# Patient Record
Sex: FEMALE | Race: WHITE | NOT HISPANIC OR LATINO | Employment: PART TIME | URBAN - METROPOLITAN AREA
[De-identification: names, ages, dates, MRNs, and addresses within clinical notes are randomized per-mention and may not be internally consistent; named-entity substitution may affect disease eponyms.]

---

## 2021-05-31 ENCOUNTER — RECORDS - HEALTHEAST (OUTPATIENT)
Dept: ADMINISTRATIVE | Facility: CLINIC | Age: 61
End: 2021-05-31

## 2021-07-13 ENCOUNTER — RECORDS - HEALTHEAST (OUTPATIENT)
Dept: ADMINISTRATIVE | Facility: CLINIC | Age: 61
End: 2021-07-13

## 2021-07-21 ENCOUNTER — RECORDS - HEALTHEAST (OUTPATIENT)
Dept: ADMINISTRATIVE | Facility: CLINIC | Age: 61
End: 2021-07-21

## 2022-06-15 ENCOUNTER — HOSPITAL ENCOUNTER (OUTPATIENT)
Dept: ULTRASOUND IMAGING | Facility: HOSPITAL | Age: 62
Discharge: HOME | End: 2022-06-15
Attending: INTERNAL MEDICINE | Admitting: INTERNAL MEDICINE

## 2022-06-15 DIAGNOSIS — I10 ESSENTIAL (PRIMARY) HYPERTENSION: ICD-10-CM

## 2022-06-15 DIAGNOSIS — R10.9 RIGHT SIDED ABDOMINAL PAIN: ICD-10-CM

## 2022-06-15 PROCEDURE — 76700 US EXAM ABDOM COMPLETE: CPT

## 2022-07-17 ENCOUNTER — HEALTH MAINTENANCE LETTER (OUTPATIENT)
Age: 62
End: 2022-07-17

## 2022-09-25 ENCOUNTER — HEALTH MAINTENANCE LETTER (OUTPATIENT)
Age: 62
End: 2022-09-25

## 2023-08-06 ENCOUNTER — HEALTH MAINTENANCE LETTER (OUTPATIENT)
Age: 63
End: 2023-08-06

## 2023-09-22 ENCOUNTER — OFFICE VISIT (OUTPATIENT)
Dept: FAMILY MEDICINE | Facility: CLINIC | Age: 63
End: 2023-09-22
Payer: COMMERCIAL

## 2023-09-22 VITALS
SYSTOLIC BLOOD PRESSURE: 118 MMHG | OXYGEN SATURATION: 98 % | HEIGHT: 67 IN | RESPIRATION RATE: 15 BRPM | WEIGHT: 127.6 LBS | TEMPERATURE: 97 F | BODY MASS INDEX: 20.03 KG/M2 | HEART RATE: 67 BPM | DIASTOLIC BLOOD PRESSURE: 69 MMHG

## 2023-09-22 DIAGNOSIS — Z76.89 ENCOUNTER TO ESTABLISH CARE: Primary | ICD-10-CM

## 2023-09-22 DIAGNOSIS — A69.20 LYME DISEASE, UNSPECIFIED: ICD-10-CM

## 2023-09-22 DIAGNOSIS — Z12.31 VISIT FOR SCREENING MAMMOGRAM: ICD-10-CM

## 2023-09-22 DIAGNOSIS — E28.39 ESTROGEN DEFICIENCY: ICD-10-CM

## 2023-09-22 DIAGNOSIS — J30.9 CHRONIC ALLERGIC RHINITIS: ICD-10-CM

## 2023-09-22 PROCEDURE — 90471 IMMUNIZATION ADMIN: CPT | Performed by: INTERNAL MEDICINE

## 2023-09-22 PROCEDURE — 99203 OFFICE O/P NEW LOW 30 MIN: CPT | Mod: 25 | Performed by: INTERNAL MEDICINE

## 2023-09-22 PROCEDURE — 90715 TDAP VACCINE 7 YRS/> IM: CPT | Performed by: INTERNAL MEDICINE

## 2023-09-22 RX ORDER — BENZONATATE 100 MG/1
100 CAPSULE ORAL
COMMUNITY
Start: 2023-05-26 | End: 2023-09-22

## 2023-09-22 RX ORDER — MONTELUKAST SODIUM 10 MG/1
TABLET ORAL
COMMUNITY
Start: 2023-03-28

## 2023-09-22 RX ORDER — SUMATRIPTAN 50 MG/1
50 TABLET, FILM COATED ORAL
Qty: 27 TABLET | Refills: 1 | COMMUNITY
Start: 2023-09-22 | End: 2024-09-09

## 2023-09-22 RX ORDER — AZELASTINE HYDROCHLORIDE, FLUTICASONE PROPIONATE 137; 50 UG/1; UG/1
1 SPRAY, METERED NASAL
COMMUNITY
Start: 2022-06-08 | End: 2024-09-09

## 2023-09-22 RX ORDER — AZELASTINE HYDROCHLORIDE, FLUTICASONE PROPIONATE 137; 50 UG/1; UG/1
2 SPRAY, METERED NASAL 2 TIMES DAILY
Qty: 69 G | Refills: 3 | Status: SHIPPED | OUTPATIENT
Start: 2023-09-22 | End: 2024-09-09

## 2023-09-22 RX ORDER — SUMATRIPTAN 50 MG/1
TABLET, FILM COATED ORAL
COMMUNITY
Start: 2022-10-20 | End: 2023-09-22

## 2023-09-22 NOTE — PROGRESS NOTES
Assessment & Plan     Radhika was seen today for establish care.    Diagnoses and all orders for this visit:    Encounter to establish care    Visit for screening mammogram  -     *MA Screening Digital Bilateral; Future    Estrogen deficiency  -     DEXA HIP/PELVIS/SPINE - Future; Future    Lyme disease, unspecified  Comments:  chronic lyme disease dx 2008, some headache and arthritis, poor stamina    Chronic allergic rhinitis  -     azelastine-fluticasone (DYMISTA) 137-50 MCG/ACT nasal spray; Spray 2 sprays into both nostrils 2 times daily    Other orders  -     REVIEW OF HEALTH MAINTENANCE PROTOCOL ORDERS  -     TDAP 7+ (ADACEL,BOOSTRIX)       Pt plans to do mammogram, bone density and got Tdap today. Declined other vaccines.      Return for chronic disease review in 1 year.      I spent a total of 31 minutes on the day of the visit.   doing chart review, history and exam, documentation and further activities as noted above       Doreen Grigsby MD PhD  [facility]   Radhika is a 63 year old, presenting for the following health issues:  Establish Care        9/22/2023     8:21 AM   Additional Questions   Roomed by [place]   Accompanied by Self         9/22/2023     8:21 AM   Patient Reported Additional Medications   Patient reports taking the following new medications None     No concerns     New patient here to establish care.   Overall healthy. Dx with lymes disease in 2008, has had chronic headache, joint pain and poor stamina.   Chronic rhinitis, recently had pressure in the ear, saw ENT, self resolving.    Colonoscopy 2020, had polyps, recommended recheck in 5 years. Done [facility]    History of Present Illness       Reason for visit:  Establish care with Dr Grigsby as primary physician    She eats 4 or more servings of fruits and vegetables daily.She consumes 0 sweetened beverage(s) daily.She exercises with enough effort to increase her heart rate 30 to 60 minutes per day.  She exercises  "with enough effort to increase her heart rate 4 days per week.   She is taking medications regularly.           Review of Systems   Constitutional, HEENT, cardiovascular, pulmonary, gi and gu systems are negative, except as otherwise noted.      Objective    /69 (BP Location: Right arm, Patient Position: Sitting, Cuff Size: Adult Regular)   Pulse 67   Temp 97  F (36.1  C) (Oral)   Resp 15   Ht 1.69 m (5' 6.54\")   Wt 57.9 kg (127 lb 9.6 oz)   SpO2 98%   BMI 20.27 kg/m    Body mass index is 20.27 kg/m .  Physical Exam   GENERAL APPEARANCE: healthy, alert, and no distress    "

## 2023-09-22 NOTE — NURSING NOTE
Prior to immunization administration, verified patients identity using patient s name and date of birth. Please see Immunization Activity for additional information.     Screening Questionnaire for Adult Immunization    Are you sick today?   No   Do you have allergies to medications, food, a vaccine component or latex?   No   Have you ever had a serious reaction after receiving a vaccination?   No   Do you have a long-term health problem with heart, lung, kidney, or metabolic disease (e.g., diabetes), asthma, a blood disorder, no spleen, complement component deficiency, a cochlear implant, or a spinal fluid leak?  Are you on long-term aspirin therapy?   No   Do you have cancer, leukemia, HIV/AIDS, or any other immune system problem?   No   Do you have a parent, brother, or sister with an immune system problem?   No   In the past 3 months, have you taken medications that affect  your immune system, such as prednisone, other steroids, or anticancer drugs; drugs for the treatment of rheumatoid arthritis, Crohn s disease, or psoriasis; or have you had radiation treatments?   No   Have you had a seizure, or a brain or other nervous system problem?   No   During the past year, have you received a transfusion of blood or blood    products, or been given immune (gamma) globulin or antiviral drug?   No   For women: Are you pregnant or is there a chance you could become       pregnant during the next month?   No   Have you received any vaccinations in the past 4 weeks?   No     Immunization questionnaire answers were all negative.      Patient instructed to remain in clinic for 15 minutes afterwards, and to report any adverse reactions.     Screening performed by Liliana Nicolas MA on 9/22/2023 at 9:00 AM.

## 2023-10-15 ENCOUNTER — HEALTH MAINTENANCE LETTER (OUTPATIENT)
Age: 63
End: 2023-10-15

## 2024-01-12 ENCOUNTER — TELEPHONE (OUTPATIENT)
Dept: FAMILY MEDICINE | Facility: CLINIC | Age: 64
End: 2024-01-12
Payer: COMMERCIAL

## 2024-01-12 NOTE — TELEPHONE ENCOUNTER
Patient Quality Outreach    Patient is due for the following:   Hypertension -  BP check  Colon Cancer Screening  Breast Cancer Screening - Mammogram  Physical Annual Wellness Visit    Next Steps:   Schedule a Annual Wellness Visit    Type of outreach:    Sent [employer] message.    Next Steps:  Reach out within 90 days via [employer].    Max number of attempts reached: Yes. Will try again in 90 days if patient still on fail list.    Questions for provider review:    None           Claudette Knight

## 2024-05-04 ENCOUNTER — TRANSFERRED RECORDS (OUTPATIENT)
Dept: HEALTH INFORMATION MANAGEMENT | Facility: CLINIC | Age: 64
End: 2024-05-04
Payer: COMMERCIAL

## 2024-05-10 ENCOUNTER — EXTERNAL ORDER RESULTS (OUTPATIENT)
Dept: FAMILY MEDICINE | Facility: CLINIC | Age: 64
End: 2024-05-10
Payer: COMMERCIAL

## 2024-07-20 ENCOUNTER — HEALTH MAINTENANCE LETTER (OUTPATIENT)
Age: 64
End: 2024-07-20

## 2024-08-14 ENCOUNTER — TELEPHONE (OUTPATIENT)
Dept: PHYSICAL MEDICINE AND REHAB | Facility: CLINIC | Age: 64
End: 2024-08-14
Payer: COMMERCIAL

## 2024-08-14 NOTE — TELEPHONE ENCOUNTER
[facility]    Phone Message    May a detailed message be left on voicemail: yes     Reason for Call: Other: Patient calling to schedule an appt with Pam Hoffman.  She does not have a referral.  She is needing to be seen for Migraines and ringing in her ears.  Writer unsure to schedule with that diagnosis.  Please call her back.      Action Taken: Message routed to:  [facility] PMR    Travel Screening: Not Applicable     Date of Service:

## 2024-08-21 ENCOUNTER — MYC MEDICAL ADVICE (OUTPATIENT)
Dept: FAMILY MEDICINE | Facility: CLINIC | Age: 64
End: 2024-08-21
Payer: COMMERCIAL

## 2024-08-29 ENCOUNTER — OFFICE VISIT (OUTPATIENT)
Dept: FAMILY MEDICINE | Facility: CLINIC | Age: 64
End: 2024-08-29
Payer: COMMERCIAL

## 2024-08-29 VITALS
HEART RATE: 60 BPM | DIASTOLIC BLOOD PRESSURE: 67 MMHG | BODY MASS INDEX: 20.89 KG/M2 | OXYGEN SATURATION: 100 % | SYSTOLIC BLOOD PRESSURE: 120 MMHG | HEIGHT: 66 IN | TEMPERATURE: 97.8 F | RESPIRATION RATE: 12 BRPM | WEIGHT: 130 LBS

## 2024-08-29 DIAGNOSIS — R10.31 RLQ ABDOMINAL PAIN: ICD-10-CM

## 2024-08-29 DIAGNOSIS — G43.709 CHRONIC MIGRAINE WITHOUT AURA WITHOUT STATUS MIGRAINOSUS, NOT INTRACTABLE: ICD-10-CM

## 2024-08-29 DIAGNOSIS — E78.5 HYPERLIPIDEMIA LDL GOAL <100: ICD-10-CM

## 2024-08-29 DIAGNOSIS — E67.3 HIGH VITAMIN D LEVEL: ICD-10-CM

## 2024-08-29 DIAGNOSIS — I10 HYPERTENSION GOAL BP (BLOOD PRESSURE) < 140/90: Primary | ICD-10-CM

## 2024-08-29 DIAGNOSIS — R00.2 PALPITATIONS: ICD-10-CM

## 2024-08-29 LAB
ALBUMIN SERPL BCG-MCNC: 4.6 G/DL (ref 3.5–5.2)
ALP SERPL-CCNC: 58 U/L (ref 40–150)
ALT SERPL W P-5'-P-CCNC: 24 U/L (ref 0–50)
ANION GAP SERPL CALCULATED.3IONS-SCNC: 9 MMOL/L (ref 7–15)
AST SERPL W P-5'-P-CCNC: 29 U/L (ref 0–45)
BILIRUB SERPL-MCNC: <0.2 MG/DL
BUN SERPL-MCNC: 19.7 MG/DL (ref 8–23)
CALCIUM SERPL-MCNC: 9.6 MG/DL (ref 8.8–10.4)
CHLORIDE SERPL-SCNC: 102 MMOL/L (ref 98–107)
CHOLEST SERPL-MCNC: 232 MG/DL
CREAT SERPL-MCNC: 0.93 MG/DL (ref 0.51–0.95)
CREAT UR-MCNC: 36 MG/DL
EGFRCR SERPLBLD CKD-EPI 2021: 68 ML/MIN/1.73M2
ERYTHROCYTE [DISTWIDTH] IN BLOOD BY AUTOMATED COUNT: 12.2 % (ref 10–15)
FASTING STATUS PATIENT QL REPORTED: NO
FASTING STATUS PATIENT QL REPORTED: NO
GLUCOSE SERPL-MCNC: 113 MG/DL (ref 70–99)
HCO3 SERPL-SCNC: 30 MMOL/L (ref 22–29)
HCT VFR BLD AUTO: 41.6 % (ref 35–47)
HDLC SERPL-MCNC: 69 MG/DL
HGB BLD-MCNC: 13.7 G/DL (ref 11.7–15.7)
LDLC SERPL CALC-MCNC: 130 MG/DL
MCH RBC QN AUTO: 31 PG (ref 26.5–33)
MCHC RBC AUTO-ENTMCNC: 32.9 G/DL (ref 31.5–36.5)
MCV RBC AUTO: 94 FL (ref 78–100)
MICROALBUMIN UR-MCNC: <12 MG/L
MICROALBUMIN/CREAT UR: NORMAL MG/G{CREAT}
NONHDLC SERPL-MCNC: 163 MG/DL
PLATELET # BLD AUTO: 220 10E3/UL (ref 150–450)
POTASSIUM SERPL-SCNC: 4 MMOL/L (ref 3.4–5.3)
PROT SERPL-MCNC: 7.2 G/DL (ref 6.4–8.3)
RBC # BLD AUTO: 4.42 10E6/UL (ref 3.8–5.2)
SODIUM SERPL-SCNC: 141 MMOL/L (ref 135–145)
TRIGL SERPL-MCNC: 166 MG/DL
VIT D+METAB SERPL-MCNC: 74 NG/ML (ref 20–50)
WBC # BLD AUTO: 7.7 10E3/UL (ref 4–11)

## 2024-08-29 PROCEDURE — 93000 ELECTROCARDIOGRAM COMPLETE: CPT | Performed by: INTERNAL MEDICINE

## 2024-08-29 PROCEDURE — 85027 COMPLETE CBC AUTOMATED: CPT | Performed by: INTERNAL MEDICINE

## 2024-08-29 PROCEDURE — 82570 ASSAY OF URINE CREATININE: CPT | Performed by: INTERNAL MEDICINE

## 2024-08-29 PROCEDURE — 82043 UR ALBUMIN QUANTITATIVE: CPT | Performed by: INTERNAL MEDICINE

## 2024-08-29 PROCEDURE — 80061 LIPID PANEL: CPT | Performed by: INTERNAL MEDICINE

## 2024-08-29 PROCEDURE — 99215 OFFICE O/P EST HI 40 MIN: CPT | Performed by: INTERNAL MEDICINE

## 2024-08-29 PROCEDURE — 82306 VITAMIN D 25 HYDROXY: CPT | Performed by: INTERNAL MEDICINE

## 2024-08-29 PROCEDURE — 36415 COLL VENOUS BLD VENIPUNCTURE: CPT | Performed by: INTERNAL MEDICINE

## 2024-08-29 PROCEDURE — 80053 COMPREHEN METABOLIC PANEL: CPT | Performed by: INTERNAL MEDICINE

## 2024-08-29 PROCEDURE — G2211 COMPLEX E/M VISIT ADD ON: HCPCS | Performed by: INTERNAL MEDICINE

## 2024-08-29 RX ORDER — PROPRANOLOL HCL 60 MG
60 CAPSULE, EXTENDED RELEASE 24HR ORAL DAILY
Qty: 30 CAPSULE | Refills: 0 | Status: SHIPPED | OUTPATIENT
Start: 2024-08-29 | End: 2024-09-16

## 2024-08-29 RX ORDER — CANDESARTAN 16 MG/1
16 TABLET ORAL DAILY
Qty: 90 TABLET | Refills: 0 | Status: SHIPPED | OUTPATIENT
Start: 2024-08-29

## 2024-08-29 RX ORDER — SUMATRIPTAN 50 MG/1
50 TABLET, FILM COATED ORAL
Qty: 27 TABLET | Refills: 3 | Status: SHIPPED | OUTPATIENT
Start: 2024-08-29

## 2024-08-29 ASSESSMENT — PAIN SCALES - GENERAL: PAINLEVEL: MILD PAIN (3)

## 2024-08-29 ASSESSMENT — ENCOUNTER SYMPTOMS: HEADACHES: 1

## 2024-08-29 NOTE — PROGRESS NOTES
Assessment & Plan     Radhika was seen today for hypertension, headache and abdominal pain.    Diagnoses and all orders for this visit:    Hypertension goal BP (blood pressure) < 140/90  -     candesartan (ATACAND) 16 MG tablet; Take 1 tablet (16 mg) by mouth daily.  -     CBC with platelets; Future  -     Comprehensive metabolic panel; Future  -     Albumin Random Urine Quantitative with Creat Ratio; Future  -     CBC with platelets  -     Comprehensive metabolic panel  -     Albumin Random Urine Quantitative with Creat Ratio    Palpitations  -     EKG 12-lead complete w/read - Clinics    High vitamin D level  -     Vitamin D Deficiency; Future  -     Vitamin D Deficiency    Hyperlipidemia LDL goal <100  -     Lipid Profile; Future  -     Lipid Profile    Chronic migraine without aura without status migrainosus, not intractable  -     SUMAtriptan (IMITREX) 50 MG tablet; Take 1 tablet (50 mg) by mouth at onset of headache for migraine. May repeat in 2 hours. Max 4 tablets/24 hours.  -     propranolol ER (INDERAL LA) 60 MG 24 hr capsule; Take 1 capsule (60 mg) by mouth daily.    RLQ abdominal pain  -     CT Abdomen Pelvis w Contrast; Future    Other orders  -     REVIEW OF HEALTH MAINTENANCE PROTOCOL ORDERS         For migraine prevention, I recommended switching Atenolol to propranolol, and give more room to raise Candesartan to 16 mg. Propranolol also has migraine prophylaxis property. Other that can be considered are SNRI and gabapentin. Pt prefers to try adjust existing medication for now.    She will be going down to [place] for a month and return. Wellness visit in October.      I spent a total of 45 minutes on the day of the visit.   doing chart review, history and exam, documentation and further activities as noted above       Subjective   Radhika is a 63 year old, presenting for the following health issues:  Hypertension, Headache, and Abdominal Pain (Right Sided Abdominal Pain )        8/29/2024     1:43 PM    Additional Questions   Roomed by Claudette BREWER   Accompanied by Self     Via the Health Maintenance questionnaire, the patient has reported the following services have been completed , this information has been sent to the abstraction team.    Pt reported having had more migraine headache since Covid. When she had covid, severe headache for 1 week. Since then, left sided migraine/stabbing HA 3-4 times a week.     Her PCP in [place] put her on Candesartan as migraine prophylaxis but not able to get to the optimal dose of 16 mg daily. Currently at 8 mg. She is also on atenolol 50 mg for BP control.     Imitrex helps. She limits its use due to prescription quantity.     She has also noted some heart palpitation since Covid. Tends to be when she lays down for a nap after lunch. When she has higher carb intake, her heart pounds harder.     Her BP has been good at home 120/67. Tends to be high in doctor's office.     She has also had RLQ abdominal pain for a few months. It is getting worse.   Wakes up her at times and nauseous.   No relation to foods or activities.     History of Present Illness       Hypertension: She presents for follow up of hypertension.  She does check blood pressure  regularly outside of the clinic. Outpatient blood pressures have not been over 140/90. She does not follow a low salt diet.     Reason for visit:  BP Med change; migraines; upper right abdominal pain    She eats 4 or more servings of fruits and vegetables daily.She consumes 0 sweetened beverage(s) daily.She exercises with enough effort to increase her heart rate 20 to 29 minutes per day.  She exercises with enough effort to increase her heart rate 4 days per week.   She is taking medications regularly.         Concern - Right Sided Abdominal Pain   Onset: For several months, more acute over the last 3 weeks   Description: Dull, throbbing ache, will have episodes of sharp pain that will wake pt up   Intensity: moderate  Progression of  "Symptoms:  worsening  Accompanying Signs & Symptoms: Denies diarrhea, Constipation, but states that she is having urinary frequency with out burning or urgency.   Previous history of similar problem: Yes, Vitamin C issues   Precipitating factors:        Worsened by: Has noticed high fiber meals make it worse   Alleviating factors:        Improved by: None   Therapies tried and outcome: None         Review of Systems  Constitutional, HEENT, cardiovascular, pulmonary, gi and gu systems are negative, except as otherwise noted.      Objective    /67   Pulse 60   Temp 97.8  F (36.6  C) (Oral)   Resp 12   Ht 1.686 m (5' 6.38\")   Wt 59 kg (130 lb)   SpO2 100%   BMI 20.74 kg/m    Body mass index is 20.74 kg/m .  Physical Exam   GENERAL: alert and no distress  EYES: Eyes grossly normal to inspection, PERRL and conjunctivae and sclerae normal  HENT: ear canals and TM's normal, nose and mouth without ulcers or lesions  NECK: no adenopathy, no asymmetry, masses, or scars  RESP: lungs clear to auscultation - no rales, rhonchi or wheezes  CV: regular rate and rhythm, normal S1 S2, no S3 or S4, no murmur, click or rub, no peripheral edema  ABDOMEN: soft, mild RLQ tenderness, no rebound or guarding, no hepatosplenomegaly, no masses and bowel sounds normal  MS: no gross musculoskeletal defects noted, no edema  SKIN: no suspicious lesions or rashes  NEURO: Normal strength and tone, mentation intact and speech normal  PSYCH: mentation appears normal, affect normal/bright    ECG was normal. Pt was feeling the palpitation (pounding hard) at the time of the ECG        Signed Electronically by: Doreen Grigsby MD PhD    "

## 2024-09-10 ENCOUNTER — MYC MEDICAL ADVICE (OUTPATIENT)
Dept: FAMILY MEDICINE | Facility: CLINIC | Age: 64
End: 2024-09-10
Payer: COMMERCIAL

## 2024-09-10 NOTE — TELEPHONE ENCOUNTER
Patient had recent visit on 8/239/24 regarding blood pressure. Routing to provider to review and advise.     Claudette Peters, MUSTAPHAN, RN   [facility]

## 2024-09-16 DIAGNOSIS — G43.709 CHRONIC MIGRAINE WITHOUT AURA WITHOUT STATUS MIGRAINOSUS, NOT INTRACTABLE: ICD-10-CM

## 2024-09-16 RX ORDER — PROPRANOLOL HCL 60 MG
60 CAPSULE, EXTENDED RELEASE 24HR ORAL DAILY
Qty: 90 CAPSULE | Refills: 2 | Status: SHIPPED | OUTPATIENT
Start: 2024-09-16

## 2024-09-23 ENCOUNTER — MYC MEDICAL ADVICE (OUTPATIENT)
Dept: FAMILY MEDICINE | Facility: CLINIC | Age: 64
End: 2024-09-23
Payer: COMMERCIAL

## 2024-10-01 ENCOUNTER — VIRTUAL VISIT (OUTPATIENT)
Dept: FAMILY MEDICINE | Facility: CLINIC | Age: 64
End: 2024-10-01
Payer: COMMERCIAL

## 2024-10-01 DIAGNOSIS — G43.009 MIGRAINE WITHOUT AURA AND WITHOUT STATUS MIGRAINOSUS, NOT INTRACTABLE: ICD-10-CM

## 2024-10-01 DIAGNOSIS — I10 HYPERTENSION GOAL BP (BLOOD PRESSURE) < 140/90: ICD-10-CM

## 2024-10-01 DIAGNOSIS — I77.6 VASCULITIS (H): ICD-10-CM

## 2024-10-01 DIAGNOSIS — F19.982 DRUG INDUCED INSOMNIA (H): Primary | ICD-10-CM

## 2024-10-01 PROCEDURE — 99213 OFFICE O/P EST LOW 20 MIN: CPT | Mod: 95 | Performed by: INTERNAL MEDICINE

## 2024-10-01 PROCEDURE — G2211 COMPLEX E/M VISIT ADD ON: HCPCS | Mod: 95 | Performed by: INTERNAL MEDICINE

## 2024-10-01 NOTE — PROGRESS NOTES
"  If patient has telephone visit, have they been educated on video visit as preferred visit method and offered to change to video visit? N/A        Instructions Relayed to Patient by Virtual Roomer:     Patient is active on [employer]:   Relayed following to patient: \"It looks like you are active on [employer], are you able to join the visit this way? If not, do you need us to send you a link now or would you like your provider to send a link via text or email when they are ready to initiate the visit?\"      Patient Confirmed they will join visit via: [employer]  Reminded patient to ensure they were logged on to virtual visit by arrival time listed.   Asked if patient has flexibility to initiate visit sooner than arrival time: patient is unable to initiate visit earlier than arrival time     If pediatric virtual visit, ensured pediatric patient along with parent/guardian will be present for video visit.     Patient offered the website [email or web address] and/or phone number to [employer] Help line: [phone]      Radhika is a 64 year old who is being evaluated via a billable video visit.    How would you like to obtain your AVS? [employer]  If the video visit is dropped, the invitation should be resent by: Text to cell phone: [phone]  Will anyone else be joining your video visit? No      Assessment & Plan     Radhika was seen today for hypertension.    Diagnoses and all orders for this visit:    Drug induced insomnia (H)    Hypertension goal BP (blood pressure) < 140/90    Migraine without aura and without status migrainosus, not intractable    Vasculitis (H)  Comments:  on her chart there is a diagnosis from 2020. not sure the nature of this diagnosis. will discuss with her at the next visit.       From BP and migraine standpoint, she is doing very well.  Likely both candesartan and propranolol have effects on her sleep. I recommended she takes both during the day, either both in the morning or one with " breakfast and one with lunch.   Possibly we can drop the propranolol down the road. Candesartan control BP better.     She has an appointment in October with me. We'll reassess.      Subjective   Radhika is a 64 year old, presenting for the following health issues:  Hypertension        10/1/2024     9:11 AM   Additional Questions   Roomed by Claudette BREWER   Accompanied by Self       Radhika Huitron is a 64 year old female who has history of HTN and migraine.     Follow up on sleep issue.  Normally she doesn't have sleep problem.  Since BP medication adjustment, has had fractured sleep.  Late August, atenolol was discontinued and replaced with propranolol 60 mg, and Candesartan was increased to 16 mg to get to dose effective for migraine prevention.     Insomnia started when she was on the combination of Candesartan 16 mg in AM and propranolol 60 mg in PM.   She recently switched the drugs to have Candesartan in PM and propranolol in AM. Sleep was worse.     Migraine has been much better with Candesartan 16 mg in the morning and propranolol in the evening.   Gets migraine once a week, not always bad enough to use Imitrex.     BP are 110/60 to 120/70, HR 60.     She has been to [place] and back but she makes these trips frequently and has a second home in [place], in familiar environment. No insomnia issue in the past.     History of Present Illness       Hypertension: She presents for follow up of hypertension.  She does check blood pressure  regularly outside of the clinic. Outpatient blood pressures have not been over 140/90. She follows a low salt diet.     She eats 4 or more servings of fruits and vegetables daily.She consumes 0 sweetened beverage(s) daily.She exercises with enough effort to increase her heart rate 20 to 29 minutes per day.  She exercises with enough effort to increase her heart rate 4 days per week.   She is taking medications regularly.           Review of Systems  Constitutional, HEENT, cardiovascular,  pulmonary, gi and gu systems are negative, except as otherwise noted.      Objective    Vitals - Patient Reported  Systolic (Patient Reported): 120  Diastolic (Patient Reported): 70  Pulse (Patient Reported): 60      Vitals:  No vitals were obtained today due to virtual visit.    Physical Exam   GENERAL: alert and no distress  EYES: Eyes grossly normal to inspection.  No discharge or erythema, or obvious scleral/conjunctival abnormalities.  RESP: No audible wheeze, cough, or visible cyanosis.    SKIN: Visible skin clear. No significant rash, abnormal pigmentation or lesions.  NEURO: Cranial nerves grossly intact.  Mentation and speech appropriate for age.  PSYCH: Appropriate affect, tone, and pace of words          Video-Visit Details    Type of service:  Video Visit   Originating Location (pt. Location): Home    Distant Location (provider location):  Off-site  Platform used for Video Visit: Gail  Signed Electronically by: Doreen Grigsby MD PhD

## 2024-10-08 ENCOUNTER — OFFICE VISIT (OUTPATIENT)
Dept: PHYSICAL MEDICINE AND REHAB | Facility: CLINIC | Age: 64
End: 2024-10-08
Payer: COMMERCIAL

## 2024-10-08 VITALS — SYSTOLIC BLOOD PRESSURE: 152 MMHG | HEART RATE: 60 BPM | DIASTOLIC BLOOD PRESSURE: 93 MMHG

## 2024-10-08 DIAGNOSIS — M79.18 MYOFASCIAL PAIN: ICD-10-CM

## 2024-10-08 DIAGNOSIS — G43.009 MIGRAINE WITHOUT AURA AND WITHOUT STATUS MIGRAINOSUS, NOT INTRACTABLE: Primary | ICD-10-CM

## 2024-10-08 PROCEDURE — 20553 NJX 1/MLT TRIGGER POINTS 3/>: CPT | Performed by: PHYSICAL MEDICINE & REHABILITATION

## 2024-10-08 PROCEDURE — 99204 OFFICE O/P NEW MOD 45 MIN: CPT | Mod: 25 | Performed by: PHYSICAL MEDICINE & REHABILITATION

## 2024-10-08 RX ORDER — BUPIVACAINE HYDROCHLORIDE 5 MG/ML
4 INJECTION, SOLUTION PERINEURAL ONCE
Status: COMPLETED | OUTPATIENT
Start: 2024-10-08 | End: 2024-10-08

## 2024-10-08 RX ADMIN — BUPIVACAINE HYDROCHLORIDE 20 MG: 5 INJECTION, SOLUTION PERINEURAL at 14:27

## 2024-10-08 NOTE — NURSING NOTE
Chief Complaint   Patient presents with    Consult     Migrain and ear ringing per pt      Haley Longoria MA on 10/8/2024 at 10:08 AM

## 2024-10-08 NOTE — LETTER
"10/8/2024      Radhika Huitron  [address] [facility] [address]      Dear Colleague,    Thank you for referring your patient, Radhika Huitron, to the [facility]. Please see a copy of my visit note below.      [facility]     PM&R CLINIC NOTE  CHRONIC MIGRAINE EVALUATION        PATIENT NAME Radhika Huitron   1960  MRN [id number]  REFERRING PROVIDER Self referred    CHIEF COMPLAINT   Chronic migraine headache    HISTORY OF PRESENT ILLNESS  Radhika Huitron is a 64 year old female who presents seeking help for a complex array of health issues, primarily centered around her debilitating headaches. She has a significant history of food allergies to eggs, pork, chicken, and beef, all of which trigger immediate migraine headaches. While her headaches started many years ago, they intensified following a COVID-19 infection in , which resulted in a prolonged five-day headache, and since then, she has been struggling with more frequent and debilitating headaches, with  15-20 headache days per month, with at least half of which are described as severe and significantly impact her quality of life.    In addition to her frequent headaches, the patient reports experiencing \"cognitive slowing,\" which she suspects may be a side effect of her current BP medications, candesartan and propranolol, prescribed by her integrative medicine doctor for blood pressure management and headache relief. She also has a prescription for Imitrex, but she finds herself overusing it--taking it at least 15 days per month, sometimes purchasing it from [employer] to ensure an adequate supply. While the Imitrex provides her with approximately 12 hours of relief, she is sensitive to its effects and feels dulled by propranolol, noting a decreased ability to elevate her heart rate during physical activities.    She also has a history of chronic neck pain on the left side, " which seems to exacerbate her headache symptoms. Additionally, she has been managing chronic body pain with doxepin and low-dose naltrexone for over a decade. The patient also endorses constant bilateral tinnitus since childhood, which worsens with headache onset. Sleep disturbances have become another concern, with her averaging about eight hours of broken sleep nightly. She is cautious about taking antidepressants or other medications with addictive properties, preferring to explore alternative solutions for her headaches.       PAST MEDICAL HISTORY  Lyme disease  Hypertension  Chronic migraine headaches    PAST SURGICAL HISTORY  No past surgical history on file.    PAST SOCIAL HISTORY  Social History     Socioeconomic History     Marital status:      Spouse name: Not on file     Number of children: Not on file     Years of education: Not on file     Highest education level: Not on file   Occupational History     Not on file   Tobacco Use     Smoking status: Never     Smokeless tobacco: Never   Vaping Use     Vaping status: Never Used   Substance and Sexual Activity     Alcohol use: Not on file     Drug use: Not on file     Sexual activity: Not on file   Other Topics Concern     Not on file   Social History Narrative     Not on file     Social Determinants of Health     Financial Resource Strain: Low Risk  (9/11/2024)    Received from [facility]    Overall Financial Resource Strain (CARDIA)      Difficulty of Paying Living Expenses: Not hard at all   Food Insecurity: No Food Insecurity (9/11/2024)    Received from [facility]    Hunger Vital Sign      Worried About Running Out of Food in the Last Year: Never true      Ran Out of Food in the Last Year: Never true   Transportation Needs: No Transportation Needs (9/11/2024)    Received from [facility] - Transportation      Lack of Transportation (Medical): No      Lack of Transportation (Non-Medical): No   Physical Activity:  Sufficiently Active (9/11/2024)    Received from [facility]    Exercise Vital Sign      Days of Exercise per Week: 4 days      Minutes of Exercise per Session: 40 min   Stress: No Stress Concern Present (9/11/2024)    Received from [facility]    [place] [employer] - Occupational Stress Questionnaire      Feeling of Stress : Only a little   Social Connections: Socially Integrated (9/11/2024)    Received from [facility]    Social Connection and Isolation Panel [NHANES]      Frequency of Communication with Friends and Family: More than three times a week      Frequency of Social Gatherings with Friends and Family: More than three times a week      Attends [employer] Services: More than 4 times per year      Active Member of Clubs or Organizations: Yes      Attends Club or Organization Meetings: More than 4 times per year      Marital Status:    Interpersonal Safety: Low Risk  (8/29/2024)    Interpersonal Safety      Do you feel physically and emotionally safe where you currently live?: Yes      Within the past 12 months, have you been hit, slapped, kicked or otherwise physically hurt by someone?: No      Within the past 12 months, have you been humiliated or emotionally abused in other ways by your partner or ex-partner?: No   Housing Stability: High Risk (9/11/2024)    Received from [facility]    Housing Stability Vital Sign      Unable to Pay for Housing in the Last Year: No      Number of Times Moved in the Last Year: 2      Homeless in the Last Year: No       FAMILY HISTORY  No family history on file.    IMMUNIZATIONS  Immunization History   Administered Date(s) Administered     COVID-19 MONOVALENT 12+ (Pfizer) 03/05/2021, 03/26/2021     Flu, Unspecified 10/01/2023     Influenza (IIV3) PF 11/14/2003, 11/22/2005, 12/19/2006, 11/28/2008, 12/17/2009     Influenza, Split Virus, Trivalent, Pf (Fluzone\Fluarix) 12/17/2009     TDAP (Adacel,Boostrix) 08/25/2011,  "09/22/2023       ALLERGIES  Allergies   Allergen Reactions     [place] [Nuts]      itchy     Mold Other (See Comments)     Amlodipine Swelling     Ankle swelling     Azithromycin Diarrhea       MEDICATIONS  Current Outpatient Medications   Medication Sig Dispense Refill     candesartan (ATACAND) 16 MG tablet Take 1 tablet (16 mg) by mouth daily. 90 tablet 0     montelukast (SINGULAIR) 10 MG tablet        propranolol ER (INDERAL LA) 60 MG 24 hr capsule Take 1 capsule (60 mg) by mouth daily. 90 capsule 2     SUMAtriptan (IMITREX) 50 MG tablet Take 1 tablet (50 mg) by mouth at onset of headache for migraine. May repeat in 2 hours. Max 4 tablets/24 hours. 27 tablet 3     No current facility-administered medications for this visit.       PHYSICAL EXAM      BP: (!) 152/93 Pulse: 60                Estimated body mass index is 20.74 kg/m  as calculated from the following:    Height as of 8/29/24: 1.686 m (5' 6.38\").    Weight as of 8/29/24: 59 kg (130 lb).  GEN: Pleasant and cooperative, in no acute distress.   HEENT: No facial asymmetry  NECK: Numerous areas of myofascial trigger points in left upper trapezius and levator scapula region.   RESP: Breathing unlabored on room air  CV: No peripheral edema  NEURO/MSK: Strength 5/5 in BUE/LE. Biceps, brachioradialis, patellar and achilles reflexes are 2/4 bilaterally. Neg edwards's sign b/l. Neg clonus b/l. No dysmetria with FNF b/l.       PROCEDURE: Trigger Point injections.     Prior to the start of the procedure and with procedural staff participation, I verbally confirmed the patient s identity using two indicators, relevant allergies, that the procedure was appropriate and matched the consent or emergent situation, and that the correct equipment/implants were available. Immediately prior to starting the procedure I conducted the Time Out with the procedural staff and re-confirmed the patient s name, procedure, and site/side. (The Joint Commission universal protocol was " followed.)  Yes    Sedation (Moderate or Deep): None    Dru% lidocaine: 2 ml   0.25% bupivacaine: 4 ml     Areas were prepped with ChloraPrep.  Using standard precautions a 30-gauge 1-inch needle was used to inject a 6 ml mixture of the above medications into the left upper trapezius, splenius, and levator scapula musculature for a total of 8 sites.       Radhika Huitron tolerated the procedure well without any immediate complications. she was allowed to recover for an appropriate period of time and was discharged home in stable condition.  Patient will follow-up regarding response to this procedure.      ASSESSMENT AND PLAN  Radhika Huitron is a 64 year old female with a history of chronic migraine headaches who is self referred for recommendations regarding her chronic migraine headaches, neck pain and tinnitus, all of which have worsened since having covid infection in 2020.     She has tried numerous conservative and pharmacological treatments for her debilitating headaches, including medications like propranolol and Imitrex, without sufficient relief. She continues to experience 15 to 20 headache days each month, significantly impacting her daily life.     Given the severity and frequency of her migraine headaches, there is clear need for a targeted prophylactic approach. I believe she may be a good candidate for a TCA given her insomnia and headaches; however, she is reluctant to try this class of medications or any antidepressants due to concerns about addictive properties. We also discussed her overuse of Imitrex and counseled her to limit its use to no more than eight times per month.    In clinic today, we administered trigger point injections into the left side of the neck to address areas of myofascial pain, which may be contributing to her headache pattern. Additionally, a referral has been placed for medication therapy management to explore alternative medication recommendations for her headache  management.       Pam Hoffman MD  Physical Medicine and Rehabilitation  [phone]      I spent a total of 52 minutes, face-to-face and managing the care of Radhika Huitron, excluding the procedure. Over 50% of my time was spent counseling the patient and coordinating care. Please see note for details.               Again, thank you for allowing me to participate in the care of your patient.        Sincerely,        Pam Hoffman MD

## 2024-10-08 NOTE — PROGRESS NOTES
"  [facility]     PM&R CLINIC NOTE  CHRONIC MIGRAINE EVALUATION        PATIENT NAME Radhika Huitron   1960  MRN [id number]  REFERRING PROVIDER Self referred    CHIEF COMPLAINT   Chronic migraine headache    HISTORY OF PRESENT ILLNESS  Radhika Huitron is a 64 year old female who presents seeking help for a complex array of health issues, primarily centered around her debilitating headaches. She has a significant history of food allergies to eggs, pork, chicken, and beef, all of which trigger immediate migraine headaches. While her headaches started many years ago, they intensified following a COVID-19 infection in , which resulted in a prolonged five-day headache, and since then, she has been struggling with more frequent and debilitating headaches, with  15-20 headache days per month, with at least half of which are described as severe and significantly impact her quality of life.    In addition to her frequent headaches, the patient reports experiencing \"cognitive slowing,\" which she suspects may be a side effect of her current BP medications, candesartan and propranolol, prescribed by her integrative medicine doctor for blood pressure management and headache relief. She also has a prescription for Imitrex, but she finds herself overusing it--taking it at least 15 days per month, sometimes purchasing it from [employer] to ensure an adequate supply. While the Imitrex provides her with approximately 12 hours of relief, she is sensitive to its effects and feels dulled by propranolol, noting a decreased ability to elevate her heart rate during physical activities.    She also has a history of chronic neck pain on the left side, which seems to exacerbate her headache symptoms. Additionally, she has been managing chronic body pain with doxepin and low-dose naltrexone for over a decade. The patient also endorses constant bilateral tinnitus since childhood, which worsens with " headache onset. Sleep disturbances have become another concern, with her averaging about eight hours of broken sleep nightly. She is cautious about taking antidepressants or other medications with addictive properties, preferring to explore alternative solutions for her headaches.       PAST MEDICAL HISTORY  Lyme disease  Hypertension  Chronic migraine headaches    PAST SURGICAL HISTORY  No past surgical history on file.    PAST SOCIAL HISTORY  Social History     Socioeconomic History    Marital status:      Spouse name: Not on file    Number of children: Not on file    Years of education: Not on file    Highest education level: Not on file   Occupational History    Not on file   Tobacco Use    Smoking status: Never    Smokeless tobacco: Never   Vaping Use    Vaping status: Never Used   Substance and Sexual Activity    Alcohol use: Not on file    Drug use: Not on file    Sexual activity: Not on file   Other Topics Concern    Not on file   Social History Narrative    Not on file     Social Determinants of Health     Financial Resource Strain: Low Risk  (9/11/2024)    Received from [facility]    Overall Financial Resource Strain (CARDIA)     Difficulty of Paying Living Expenses: Not hard at all   Food Insecurity: No Food Insecurity (9/11/2024)    Received from [facility]    Hunger Vital Sign     Worried About Running Out of Food in the Last Year: Never true     Ran Out of Food in the Last Year: Never true   Transportation Needs: No Transportation Needs (9/11/2024)    Received from [facility]    [facility] - Transportation     Lack of Transportation (Medical): No     Lack of Transportation (Non-Medical): No   Physical Activity: Sufficiently Active (9/11/2024)    Received from [facility]    Exercise Vital Sign     Days of Exercise per Week: 4 days     Minutes of Exercise per Session: 40 min   Stress: No Stress Concern Present (9/11/2024)    Received from [facility]    [place]  [employer] Occup[employer] Health - Occupational Stress Questionnaire     Feeling of Stress : Only a little   Social Connections: Socially Integrated (9/11/2024)    Received from [facility]    Social Connection and Isolation Panel [NHANES]     Frequency of Communication with Friends and Family: More than three times a week     Frequency of Social Gatherings with Friends and Family: More than three times a week     Attends [employer] Services: More than 4 times per year     Active Member of Clubs or Organizations: Yes     Attends Club or Organization Meetings: More than 4 times per year     Marital Status:    Interpersonal Safety: Low Risk  (8/29/2024)    Interpersonal Safety     Do you feel physically and emotionally safe where you currently live?: Yes     Within the past 12 months, have you been hit, slapped, kicked or otherwise physically hurt by someone?: No     Within the past 12 months, have you been humiliated or emotionally abused in other ways by your partner or ex-partner?: No   Housing Stability: High Risk (9/11/2024)    Received from [facility]    Housing Stability Vital Sign     Unable to Pay for Housing in the Last Year: No     Number of Times Moved in the Last Year: 2     Homeless in the Last Year: No       FAMILY HISTORY  No family history on file.    IMMUNIZATIONS  Immunization History   Administered Date(s) Administered    COVID-19 MONOVALENT 12+ (Pfizer) 03/05/2021, 03/26/2021    Flu, Unspecified 10/01/2023    Influenza (IIV3) PF 11/14/2003, 11/22/2005, 12/19/2006, 11/28/2008, 12/17/2009    Influenza, Split Virus, Trivalent, Pf (Fluzone\Fluarix) 12/17/2009    TDAP (Adacel,Boostrix) 08/25/2011, 09/22/2023       ALLERGIES  Allergies   Allergen Reactions    [place] [Nuts]      itchy    Mold Other (See Comments)    Amlodipine Swelling     Ankle swelling    Azithromycin Diarrhea       MEDICATIONS  Current Outpatient Medications   Medication Sig Dispense Refill    candesartan (ATACAND) 16 MG tablet  "Take 1 tablet (16 mg) by mouth daily. 90 tablet 0    montelukast (SINGULAIR) 10 MG tablet       propranolol ER (INDERAL LA) 60 MG 24 hr capsule Take 1 capsule (60 mg) by mouth daily. 90 capsule 2    SUMAtriptan (IMITREX) 50 MG tablet Take 1 tablet (50 mg) by mouth at onset of headache for migraine. May repeat in 2 hours. Max 4 tablets/24 hours. 27 tablet 3     No current facility-administered medications for this visit.       PHYSICAL EXAM      BP: (!) 152/93 Pulse: 60                Estimated body mass index is 20.74 kg/m  as calculated from the following:    Height as of 24: 1.686 m (5' 6.38\").    Weight as of 24: 59 kg (130 lb).  GEN: Pleasant and cooperative, in no acute distress.   HEENT: No facial asymmetry  NECK: Numerous areas of myofascial trigger points in left upper trapezius and levator scapula region.   RESP: Breathing unlabored on room air  CV: No peripheral edema  NEURO/MSK: Strength 5/5 in BUE/LE. Biceps, brachioradialis, patellar and achilles reflexes are 2/4 bilaterally. Neg edwards's sign b/l. Neg clonus b/l. No dysmetria with FNF b/l.       PROCEDURE: Trigger Point injections.     Prior to the start of the procedure and with procedural staff participation, I verbally confirmed the patient s identity using two indicators, relevant allergies, that the procedure was appropriate and matched the consent or emergent situation, and that the correct equipment/implants were available. Immediately prior to starting the procedure I conducted the Time Out with the procedural staff and re-confirmed the patient s name, procedure, and site/side. (The Joint Commission universal protocol was followed.)  Yes    Sedation (Moderate or Deep): None    Dru% lidocaine: 2 ml   0.25% bupivacaine: 4 ml     Areas were prepped with ChloraPrep.  Using standard precautions a 30-gauge 1-inch needle was used to inject a 6 ml mixture of the above medications into the left upper trapezius, splenius, and levator " scapula musculature for a total of 8 sites.       Radhika Huitron tolerated the procedure well without any immediate complications. she was allowed to recover for an appropriate period of time and was discharged home in stable condition.  Patient will follow-up regarding response to this procedure.      ASSESSMENT AND PLAN  Radhika Huitron is a 64 year old female with a history of chronic migraine headaches who is self referred for recommendations regarding her chronic migraine headaches, neck pain and tinnitus, all of which have worsened since having covid infection in 2020.     She has tried numerous conservative and pharmacological treatments for her debilitating headaches, including medications like propranolol and Imitrex, without sufficient relief. She continues to experience 15 to 20 headache days each month, significantly impacting her daily life.     Given the severity and frequency of her migraine headaches, there is clear need for a targeted prophylactic approach. I believe she may be a good candidate for a TCA given her insomnia and headaches; however, she is reluctant to try this class of medications or any antidepressants due to concerns about addictive properties. We also discussed her overuse of Imitrex and counseled her to limit its use to no more than eight times per month.    In clinic today, we administered trigger point injections into the left side of the neck to address areas of myofascial pain, which may be contributing to her headache pattern. Additionally, a referral has been placed for medication therapy management to explore alternative medication recommendations for her headache management.       Pam Hoffman MD  Physical Medicine and Rehabilitation  [phone]      I spent a total of 52 minutes, face-to-face and managing the care of Radhika Huitron, excluding the procedure. Over 50% of my time was spent counseling the patient and coordinating care. Please see note for details.

## 2024-10-11 ENCOUNTER — VIRTUAL VISIT (OUTPATIENT)
Dept: PHARMACY | Facility: CLINIC | Age: 64
End: 2024-10-11
Attending: PHYSICAL MEDICINE & REHABILITATION
Payer: COMMERCIAL

## 2024-10-11 DIAGNOSIS — G43.009 MIGRAINE WITHOUT AURA AND WITHOUT STATUS MIGRAINOSUS, NOT INTRACTABLE: Primary | ICD-10-CM

## 2024-10-11 DIAGNOSIS — R52 PAIN: ICD-10-CM

## 2024-10-11 DIAGNOSIS — R12 HEART BURN: ICD-10-CM

## 2024-10-11 DIAGNOSIS — J30.2 SEASONAL ALLERGIC RHINITIS: ICD-10-CM

## 2024-10-11 DIAGNOSIS — Z78.9 TAKES DIETARY SUPPLEMENTS: ICD-10-CM

## 2024-10-11 DIAGNOSIS — G47.00 INSOMNIA: ICD-10-CM

## 2024-10-11 DIAGNOSIS — I10 HYPERTENSION GOAL BP (BLOOD PRESSURE) < 140/90: ICD-10-CM

## 2024-10-11 RX ORDER — DOXEPIN HYDROCHLORIDE 10 MG/ML
10 SOLUTION ORAL AT BEDTIME
COMMUNITY

## 2024-10-11 RX ORDER — MAGNESIUM GLYCINATE 100 MG
200 CAPSULE ORAL DAILY
COMMUNITY

## 2024-10-11 RX ORDER — AZELASTINE HYDROCHLORIDE 137 UG/1
1 SPRAY, METERED NASAL 2 TIMES DAILY
COMMUNITY
Start: 2024-08-31

## 2024-10-11 RX ORDER — FAMOTIDINE 20 MG/1
20 TABLET, FILM COATED ORAL
COMMUNITY

## 2024-10-11 RX ORDER — ZINC GLUCONATE 50 MG
50 TABLET ORAL DAILY
COMMUNITY

## 2024-10-11 RX ORDER — MULTIPLE VITAMINS W/ MINERALS TAB 9MG-400MCG
1 TAB ORAL DAILY
COMMUNITY

## 2024-10-11 RX ORDER — THEANINE 100 MG
100 CAPSULE ORAL DAILY
COMMUNITY

## 2024-10-11 RX ORDER — LORATADINE 10 MG/1
10 TABLET ORAL DAILY
COMMUNITY

## 2024-10-11 NOTE — Clinical Note
Dr. Hoffman - please see my staff message with full recommendation. We discussed starting amitriptyline and stopping doxepin. We also discussed some options to help with migraine attacks like naproxen and a gepant (nurtec or ubrelvy depending on insurance). Please let me know your thoughts.  Thanks!

## 2024-10-11 NOTE — PROGRESS NOTES
Medication Therapy Management (MTM) Encounter    ASSESSMENT:                            Medication Adherence/Access: No issues identified.    Migraine:   Patient may benefit from additional preventative medication to help reduce her migraine days each month. Goal is to reduce her migraine days by fifty percent. Discussed starting a TCA like amitriptyline at a low dose and stop doxepin due to being in the same class. Amitriptyline may also help with sleep. She has experienced some benefit with propranolol and candesartan so will plan to continue. Discussed other potential options to consider in the future such as coenzyme Q10, oral gepants, injectable CGRP antagonists, or gabapentin. Discussed continuing propranolol, vitamin B2, and candesartan as preventative agents. Reviewed medication overuse headache and to reduce sumatriptan to less than 9 days per month. Reviewed other options to use when suffering from a migraine such as naproxen (limiting to less than 14 days per month) and a gepant such as Nurtec or Ubrelvy. Will discuss the above recommendations with Dr. Hoffman and follow up with patient once a plan is made. Reviewed side effects of all options discussed and answered all patients questions.     Hypertension   Blood pressure is controlled but is low. The current dose of candesartan is helping reduce her migraines and she is not symptomatic at the current dose. No changes recommended today.     Allergy   Stable, continue with current over the counter medication regimen.      GERD    Stable, continue with current regimen.      Supplements   Stable, continue with over the counter supplement. Continue with riboflavin which may be beneficial for migraines. She could consider starting Co-enzyme Q10 100mg three times daily which may also provide some relief.      Insomnia   If amitriptyline is started then will recommend to stop doxepin due to being in the same class of medications. Amitriptyline will provide some  sleep benefits as well    Pain:   Stable, continue with current medication regimen    PLAN:                            Today we discussed different options to treat migraines.  I will discuss starting amitriptyline and stopping doxepin with Dr. Hoffman  Amitriptyline is a preventative medication. Some side effects include dry mouth, constipation, and feeling tired. It is best to take at night  Reduce sumatriptan use to less than 9 days per month. This will help prevent medication overuse headaches   I will also discuss adding naproxen and either Nurtec or Ubrelvy (depending on insurance coverage) for days that are not bad enough to take sumatriptan but you still need something to help with reducing the migraine symptoms  Nurtec and Ubrelvy are used to treat a migraine and are in a class of medications called CGRP receptor antagonists  You could also consider starting co-enzyme Q10. The dose is usually 100mg three times daily   Continue propranolol and candesartan     Follow-up: 11/15/2024 at 2:00PM    SUBJECTIVE/OBJECTIVE:                          Radhika Huitron is a 64 year old female seen for an initial visit. She was referred to me from Dr. Hoffman.      Reason for visit: Discuss migraine medications.    Allergies/ADRs: Reviewed in chart  Past Medical History: Reviewed in chart  Tobacco: She reports that she has never smoked. She has never used smokeless tobacco.  Alcohol: Less than 1 beverage / month  Caffeine: espresso each morning and 16 ounce tea   Medication Adherence/Access: no issues reported.    Migraine:   Preventive medications  -Propranolol ER 60 mg once daily at dinner time  -Candesartan 16mg once daily in the morning    This combination has been helpful at reducing her migraines. Her current timing of these medications is also been the most helpful     She did get a trigger point injection on Monday which was helpful.     Acute medications  Sumatriptan 50 mg at onset of migraine. Does not need to  repeat a second dose and lasts about 12 hours. Using about 15 times per month. Has had to get prescriptions through [facility] to bridge her when insurance won't cover any more   -Acetaminophen 500mg every 6 hours as needed - needing once or twice per month    Triggers include: Food - eggs, garlic, onions, leeks, shallots,   Associated symptoms include: Neck pain, feeling of fullness in her neck, and then experiencing pain in her sinuses. Does not need to avoid light and sound but sounds and light can be more pronounced   Patient reports having a headache almost every day. When she experiences a migraine she will have it for 2-3 days straight and will keep taking sumatriptan each day until it is gone.   Number of migraines has been reduced with her current preventative regimen. Prior to this she would have a migraine for about 5 days straight  Current non-pharmacologic treatments include: heating pads and a ice cap but only helps temporarily. Going for a walk can reduce. Going to the sauna  Current side effects include: not being able to sleep through the night. She is waking up multiple times a night. She feels like she is not able to exercise to her full potential  Past medications tried/failed: naratriptan (was not as effective as sumatriptan) Tried sumatriptan nasal but made migraines worse    Hypertension   -Candesartan 16mg once daily in the morning   Patient reports no current medication side effects  Patient self monitors blood pressure.  Home BP monitoring   .    Yesterday: 101/66 HR -71  10/9: 100/? HR of 66  Normally it is running 110-120/60-70   Heart rate is mostly in the 60's   She does exercise but feels like she doesn't have oomph to get up and go  Feeling very fuzzy, cognitively feeling dull       Allergy   -Loratadine 10 mg once daily  -Montelukast 10mg once daily  -Azelastine 137mcg nasal spray in each nostril twice daily  Patient reports no current medication side effects.    Patient feels that  current therapy is effective.      GERD    Famotidine 20 mg once daily   Patient feels that current regimen is effective.       Supplements   -L-theanine 100mg once daily  -Magnesium glycinate 200mg once daily  -Melatonin 3mg once daily  -methylcobalamin 12.5mg injection three times weekly  -Multivitamin once daily  -Fermented melatonin 8mg nightly  -Riboflavin 400mg once daily  -Zinc gluconate 50mg once daily  No reported issues at this time.       Insomnia   -Doxepin 10mg drops at bedtime   Patient reports trouble staying asleep.      Pain  -Naltrexone 3mg once daily     Today's Vitals: There were no vitals taken for this visit.  ----------------      I spent 61 minutes with this patient today. I offer these suggestions for consideration by Dr. Hoffman. A copy of the visit note was provided to the patient's provider(s).    A summary of these recommendations was sent via [employer].    Tatum Dickey, PharmD, BCACP  Medication Therapy Management Pharmacist   [facility]    Telemedicine Visit Details  The patient's medications can be safely assessed via a telemedicine encounter.  Type of service:  Telephone visit  Originating Location (pt. Location): Home  Distant Location (provider location):  Off-site  Start Time: 1:59 PM  End Time:  3:00 PM     Medication Therapy Recommendations  No medication therapy recommendations to display

## 2024-10-13 NOTE — PATIENT INSTRUCTIONS
"Recommendations from today's MTM visit:                                                    Today we reviewed what your medicines are for, how to know if they are working, that your medicines are safe and how to make your medicine regimen as easy as possible.      Today we discussed different options to treat migraines.  I will discuss starting amitriptyline and stopping doxepin with Dr. Hoffman  Amitriptyline is a preventative medication. Some side effects include dry mouth, constipation, and feeling tired. It is best to take at night  Reduce sumatriptan use to less than 9 days per month. This will help prevent medication overuse headaches   I will also discuss adding naproxen and either Nurtec or Ubrelvy (depending on insurance coverage) for days that are not bad enough to take sumatriptan but you still need something to help with reducing the migraine symptoms  Nurtec and Ubrelvy are used to treat a migraine and are in a class of medications called CGRP receptor antagonists  You could also consider starting co-enzyme Q10. The dose is usually 100mg three times daily   Continue propranolol and candesartan     Follow-up: 11/15/2024 at 2:00PM    It was great speaking with you today.  I value your experience and would be very thankful for your time in providing feedback in our clinic survey. In the next few days, you may receive an email or text message from [employer] with a link to a survey related to your  clinical pharmacist.\"     To schedule another MTM appointment, please call the clinic directly or you may call the MTM scheduling line at [phone] or toll-free at [phone].     My Clinical Pharmacist's contact information:                                                      Please feel free to contact me with any questions or concerns you have.      Tatum Dickey, PharmD, BCACP  Medication Therapy Management Pharmacist   [facility]     "

## 2024-10-15 DIAGNOSIS — G43.009 MIGRAINE WITHOUT AURA AND WITHOUT STATUS MIGRAINOSUS, NOT INTRACTABLE: Primary | ICD-10-CM

## 2024-10-24 SDOH — HEALTH STABILITY: PHYSICAL HEALTH: ON AVERAGE, HOW MANY DAYS PER WEEK DO YOU ENGAGE IN MODERATE TO STRENUOUS EXERCISE (LIKE A BRISK WALK)?: 4 DAYS

## 2024-10-24 SDOH — HEALTH STABILITY: PHYSICAL HEALTH: ON AVERAGE, HOW MANY MINUTES DO YOU ENGAGE IN EXERCISE AT THIS LEVEL?: 30 MIN

## 2024-10-24 ASSESSMENT — SOCIAL DETERMINANTS OF HEALTH (SDOH): HOW OFTEN DO YOU GET TOGETHER WITH FRIENDS OR RELATIVES?: MORE THAN THREE TIMES A WEEK

## 2024-10-25 ENCOUNTER — OFFICE VISIT (OUTPATIENT)
Dept: FAMILY MEDICINE | Facility: CLINIC | Age: 64
End: 2024-10-25
Payer: COMMERCIAL

## 2024-10-25 VITALS
RESPIRATION RATE: 13 BRPM | TEMPERATURE: 97.3 F | SYSTOLIC BLOOD PRESSURE: 111 MMHG | WEIGHT: 131.9 LBS | HEIGHT: 66 IN | OXYGEN SATURATION: 98 % | BODY MASS INDEX: 21.2 KG/M2 | HEART RATE: 77 BPM | DIASTOLIC BLOOD PRESSURE: 74 MMHG

## 2024-10-25 DIAGNOSIS — M85.89 OSTEOPENIA OF MULTIPLE SITES: ICD-10-CM

## 2024-10-25 DIAGNOSIS — I10 HYPERTENSION GOAL BP (BLOOD PRESSURE) < 140/90: Primary | ICD-10-CM

## 2024-10-25 DIAGNOSIS — G43.009 MIGRAINE WITHOUT AURA AND WITHOUT STATUS MIGRAINOSUS, NOT INTRACTABLE: ICD-10-CM

## 2024-10-25 DIAGNOSIS — Z13.6 SCREENING FOR ISCHEMIC HEART DISEASE: ICD-10-CM

## 2024-10-25 PROCEDURE — G2211 COMPLEX E/M VISIT ADD ON: HCPCS | Performed by: INTERNAL MEDICINE

## 2024-10-25 PROCEDURE — 99214 OFFICE O/P EST MOD 30 MIN: CPT | Performed by: INTERNAL MEDICINE

## 2024-10-25 RX ORDER — PROPRANOLOL HCL 20 MG
20 TABLET ORAL EVERY EVENING
Qty: 30 TABLET | Refills: 0 | Status: SHIPPED | OUTPATIENT
Start: 2024-10-25

## 2024-10-25 ASSESSMENT — PAIN SCALES - GENERAL: PAINLEVEL_OUTOF10: NO PAIN (0)

## 2024-10-25 NOTE — PROGRESS NOTES
Preventive Care Visit  [facility]  Doreen Grigsby MD PhD, Internal Medicine - Pediatrics  Oct 25, 2024      Assessment & Plan     Radhika was seen today for annual visit.    Diagnoses and all orders for this visit:    Hypertension goal BP (blood pressure) < 140/90  Comments:  BP at goal.    Screening for ischemic heart disease  -     CT Coronary Calcium Scan; Future    Migraine without aura and without status migrainosus, not intractable  -     propranolol (INDERAL) 20 MG tablet; Take 1 tablet (20 mg) by mouth every evening.    Osteopenia of multiple sites  -     DEXA HIP/PELVIS/SPINE - Future; Future    Other orders  -     Cancel: INFLUENZA HIGH DOSE, TRIVALENT, PF (FLUZONE)  -     Cancel: Pneumococcal 20 Valent Conjugate (PCV20)  -     PRIMARY CARE FOLLOW-UP SCHEDULING; Future       Will keep candesartan 16 mg. Change Propranolol XL 60 mg to 20 mg regular release at bedtime.   If BP gets above 140/90, consider adding hydrochlorothiazide 12.5 mg.   She will Mychart me regarding her BP in 2-3 weeks.  Return next September for Welcome to Medicare visit.       Subjective   Radhika is a 64 year old, presenting for the following:  Annual Visit          Radhika Huitron is a 64 year old female who does not have any pertinent problems on file.     Originally scheduled for physical but she had one done in [place]. Wanted to discuss BP and migraine headache.    The combination of propranolol and  Candesartan really help reducing migraine headache. Had only one migraine in the last month.     BP has been excellent in the 100s - 110s systolic, 60-70s diastolic. It is well controlled.     She feels the propranolol is causing constipation and some nausea and sleepiness.     Has hyperlipidemia, wondering about getting coronary calcium CT to evaluate her heart risk. Not on statin.         Health Care Directive  Patient does not have a Health Care Directive: Patient states has Advance Directive and will bring in a  copy to clinic.      10/24/2024   General Health   How would you rate your overall physical health? Good   Feel stress (tense, anxious, or unable to sleep) To some extent      (!) STRESS CONCERN      10/24/2024   Nutrition   Diet: Low salt    Gluten-free/reduced       Multiple values from one day are sorted in reverse-chronological order         10/24/2024   Exercise   Days per week of moderate/strenous exercise 4 days   Average minutes spent exercising at this level 30 min            10/24/2024   Social Factors   Frequency of gathering with friends or relatives More than three times a week   Worry food won't last until get money to buy more No   Food not last or not have enough money for food? No   Do you have housing? (Housing is defined as stable permanent housing and does not include staying ouside in a car, in a tent, in an abandoned building, in an overnight shelter, or couch-surfing.) Yes   Are you worried about losing your housing? No   Lack of transportation? No   Unable to get utilities (heat,electricity)? No            10/24/2024   Fall Risk   Fallen 2 or more times in the past year? No     No    Trouble with walking or balance? No     No        Patient-reported    Multiple values from one day are sorted in reverse-chronological order          10/24/2024   Activities of Daily Living- Home Safety   Needs help with the following daily activites None of the above   Safety concerns in the home None of the above            10/24/2024   Dental   Dentist two times every year? Yes            10/24/2024   Hearing Screening   Hearing concerns? (!) IT'S HARD TO FOLLOW A CONVERSATION IN A NOISY RESTAURANT OR CROWDED ROOM.            10/24/2024   Driving Risk Screening   Patient/family members have concerns about driving No            10/24/2024   General Alertness/Fatigue Screening   Have you been more tired than usual lately? (!) YES            10/24/2024   Urinary Incontinence Screening   Bothered by leaking urine  in past 6 months No            10/24/2024   TB Screening   Were you born outside of the [place]? No            Today's PHQ-2 Score:       10/24/2024     4:47 PM   PHQ-2 ( 1999 [employer])   Q1: Little interest or pleasure in doing things 0    Q2: Feeling down, depressed or hopeless 0    PHQ-2 Score 0    Q1: Little interest or pleasure in doing things Not at all   Q2: Feeling down, depressed or hopeless Not at all   PHQ-2 Score 0       Patient-reported           10/24/2024   Substance Use   Alcohol more than 3/day or more than 7/wk No   Do you have a current opioid prescription? No   How severe/bad is pain from 1 to 10? 2/10   Do you use any other substances recreationally? No        Social History     Tobacco Use    Smoking status: Never    Smokeless tobacco: Never   Vaping Use    Vaping status: Never Used   Substance Use Topics    Alcohol use: Yes    Drug use: Never          ASCVD Risk   The 10-year ASCVD risk score (Dilia ANGEL, et al., 2019) is: 6.2%    Values used to calculate the score:      Age: 64 years      Sex: Female      Is Non- : No      Diabetic: No      Tobacco smoker: No      Systolic Blood Pressure: 111 mmHg      Is BP treated: Yes      HDL Cholesterol: 69 mg/dL      Total Cholesterol: 232 mg/dL        Reviewed and updated as needed this visit by Provider                    Current providers sharing in care for this patient include:  Patient Care Team:  Doreen Grigsby MD PhD as PCP - General (Internal Medicine - Pediatrics)  Doreen Grigsby MD PhD as Assigned PCP  Tatum Dickey RPH as Pharmacist (Pharmacist)  Pam Hoffman MD as MD (Physical Medicine and Rehabilitation)  Tatum Dickey RPH as Assigned MTM Pharmacist  Pam Hoffman MD as Assigned Neuroscience Provider    The following health maintenance items are reviewed in Epic and correct as of today:  Health Maintenance   Topic Date Due    DEXA  Never done    Pneumococcal Vaccine: 65+ Years (1 of 1 - PCV)  "Never done    INFLUENZA VACCINE (1) 09/01/2024    HEPATITIS C SCREENING  08/29/2025 (Originally 9/5/1978)    BMP  08/29/2025    ANNUAL REVIEW OF HM ORDERS  08/29/2025    MEDICARE ANNUAL WELLNESS VISIT  10/25/2025    FALL RISK ASSESSMENT  10/25/2025    MAMMO SCREENING  05/01/2026    GLUCOSE  08/29/2027    LIPID  08/29/2029    ADVANCE CARE PLANNING  10/25/2029    COLORECTAL CANCER SCREENING  10/30/2030    RSV VACCINE (1 - 1-dose 75+ series) 09/05/2033    DTAP/TDAP/TD IMMUNIZATION (3 - Td or Tdap) 09/22/2033    PHQ-2 (once per calendar year)  Completed    HPV IMMUNIZATION  Aged Out    MENINGITIS IMMUNIZATION  Aged Out    RSV MONOCLONAL ANTIBODY  Aged Out    PAP  Discontinued    ZOSTER IMMUNIZATION  Discontinued    COVID-19 Vaccine  Discontinued         Review of Systems  Constitutional, HEENT, cardiovascular, pulmonary, gi and gu systems are negative, except as otherwise noted.     Objective    Exam  /74   Pulse 77   Temp 97.3  F (36.3  C) (Temporal)   Resp 13   Ht 1.676 m (5' 6\")   Wt 59.8 kg (131 lb 14.4 oz)   SpO2 98%   BMI 21.29 kg/m     Estimated body mass index is 21.29 kg/m  as calculated from the following:    Height as of this encounter: 1.676 m (5' 6\").    Weight as of this encounter: 59.8 kg (131 lb 14.4 oz).    Physical Exam  GENERAL: alert and no distress    Office Visit on 08/29/2024   Component Date Value Ref Range Status    WBC Count 08/29/2024 7.7  4.0 - 11.0 10e3/uL Final    RBC Count 08/29/2024 4.42  3.80 - 5.20 10e6/uL Final    Hemoglobin 08/29/2024 13.7  11.7 - 15.7 g/dL Final    Hematocrit 08/29/2024 41.6  35.0 - 47.0 % Final    MCV 08/29/2024 94  78 - 100 fL Final    MCH 08/29/2024 31.0  26.5 - 33.0 pg Final    MCHC 08/29/2024 32.9  31.5 - 36.5 g/dL Final    RDW 08/29/2024 12.2  10.0 - 15.0 % Final    Platelet Count 08/29/2024 220  150 - 450 10e3/uL Final    Sodium 08/29/2024 141  135 - 145 mmol/L Final    Potassium 08/29/2024 4.0  3.4 - 5.3 mmol/L Final    Carbon Dioxide (CO2) " 08/29/2024 30 (H)  22 - 29 mmol/L Final    Anion Gap 08/29/2024 9  7 - 15 mmol/L Final    Urea Nitrogen 08/29/2024 19.7  8.0 - 23.0 mg/dL Final    Creatinine 08/29/2024 0.93  0.51 - 0.95 mg/dL Final    GFR Estimate 08/29/2024 68  >60 mL/min/1.73m2 Final    eGFR calculated using 2021 CKD-EPI equation.    Calcium 08/29/2024 9.6  8.8 - 10.4 mg/dL Final    Reference intervals for this test were updated on 7/16/2024 to reflect our healthy population more accurately. There may be differences in the flagging of prior results with similar values performed with this method. Those prior results can be interpreted in the context of the updated reference intervals.    Chloride 08/29/2024 102  98 - 107 mmol/L Final    Glucose 08/29/2024 113 (H)  70 - 99 mg/dL Final    Alkaline Phosphatase 08/29/2024 58  40 - 150 U/L Final    AST 08/29/2024 29  0 - 45 U/L Final    ALT 08/29/2024 24  0 - 50 U/L Final    Protein Total 08/29/2024 7.2  6.4 - 8.3 g/dL Final    Albumin 08/29/2024 4.6  3.5 - 5.2 g/dL Final    Bilirubin Total 08/29/2024 <0.2  <=1.2 mg/dL Final    Patient Fasting > 8hrs? 08/29/2024 No   Final    Cholesterol 08/29/2024 232 (H)  <200 mg/dL Final    Triglycerides 08/29/2024 166 (H)  <150 mg/dL Final    Direct Measure HDL 08/29/2024 69  >=50 mg/dL Final    LDL Cholesterol Calculated 08/29/2024 130 (H)  <=100 mg/dL Final    Non HDL Cholesterol 08/29/2024 163 (H)  <130 mg/dL Final    Patient Fasting > 8hrs? 08/29/2024 No   Final    Creatinine Urine mg/dL 08/29/2024 36.0  mg/dL Final    The reference ranges have not been established in urine creatinine. The results should be integrated into the clinical context for interpretation.    Albumin Urine mg/L 08/29/2024 <12.0  mg/L Final    The reference ranges have not been established in urine albumin. The results should be integrated into the clinical context for interpretation.    Albumin Urine mg/g Cr 08/29/2024    Final    Unable to calculate, urine albumin and/or urine  creatinine is outside detectable limits.  Microalbuminuria is defined as an albumin:creatinine ratio of 17 to 299 for males and 25 to 299 for females. A ratio of albumin:creatinine of 300 or higher is indicative of overt proteinuria.  Due to biologic variability, positive results should be confirmed by a second, first-morning random or 24-hour timed urine specimen. If there is discrepancy, a third specimen is recommended. When 2 out of 3 results are in the microalbuminuria range, this is evidence for incipient nephropathy and warrants increased efforts at glucose control, blood pressure control, and institution of therapy with an angiotensin-converting-enzyme (ACE) inhibitor (if the patient can tolerate it).      Vitamin D, Total (25-Hydroxy) 08/29/2024 74 (H)  20 - 50 ng/mL Final    indicates supplementation, with increased risk of hypercalciuria     The 10-year ASCVD risk score (Dilia ANGEL, et al., 2019) is: 6.2%    Values used to calculate the score:      Age: 64 years      Sex: Female      Is Non- : No      Diabetic: No      Tobacco smoker: No      Systolic Blood Pressure: 111 mmHg      Is BP treated: Yes      HDL Cholesterol: 69 mg/dL      Total Cholesterol: 232 mg/dL     Signed Electronically by: Doreen Grigsby MD PhD

## 2024-10-28 ENCOUNTER — PATIENT OUTREACH (OUTPATIENT)
Dept: CARE COORDINATION | Facility: CLINIC | Age: 64
End: 2024-10-28
Payer: COMMERCIAL

## 2024-10-29 ENCOUNTER — TELEPHONE (OUTPATIENT)
Dept: PHYSICAL MEDICINE AND REHAB | Facility: CLINIC | Age: 64
End: 2024-10-29
Payer: COMMERCIAL

## 2024-10-29 NOTE — TELEPHONE ENCOUNTER
Please see that a telephone/Prior Authorization encounter was opened today, 10/29, and sent to the Medication Prior Auth team.  Closing this encounter.    Maryanne Glass RN on 10/29/2024 at 9:44 AM

## 2024-10-29 NOTE — TELEPHONE ENCOUNTER
Prior Authorization Retail Medication Request    Medication/Dose: rimegepant (NURTEC) 75 MG ODT tablet  Diagnosis and ICD code (if different than what is on RX):    New/renewal/insurance change PA/secondary ins. PA:  Previously Tried and Failed:    Rationale:      Insurance   Primary: BCBS OUT OF STATE  Insurance ID:  [id number]    Secondary (if applicable):  Insurance ID:      Pharmacy Information (if different than what is on RX)  Name:    Phone:    Fax:    Clinic Information  Preferred routing pool for dept communication: [facility] NEURO CLINICAL CARE TEAM

## 2024-10-30 NOTE — TELEPHONE ENCOUNTER
Central Prior Authorization Team   Phone: [phone]    PA Initiation    Medication: rimegepant (NURTEC) 75 MG ODT tablet  Insurance Company: [employer] [place] - Phone [phone] Fax [phone]  Pharmacy Filling the Rx: [id number] - [place], [place] - [address]  Filling Pharmacy Phone: [phone]  Filling Pharmacy Fax:    Start Date: 10/30/2024

## 2024-10-30 NOTE — TELEPHONE ENCOUNTER
Prior Authorization Approval    Authorization Effective Date: 9/30/2024  Authorization Expiration Date: 10/30/2025  Medication: rimegepant (NURTEC) 75 MG ODT tablet  Approved Dose/Quantity:   Reference #:     Insurance Company: [employer] - Phone [phone] Fax [phone]  Expected CoPay:       CoPay Card Available:      Foundation Assistance Needed:    Which Pharmacy is filling the prescription (Not needed for infusion/clinic administered): [id number] - [place], [place] - [address] [place] [address]  Pharmacy Notified:  yes  Patient Notified:  yes- Pharmacy will contact patient when ready to /ship

## 2024-11-15 ENCOUNTER — VIRTUAL VISIT (OUTPATIENT)
Dept: PHARMACY | Facility: CLINIC | Age: 64
End: 2024-11-15
Attending: PHYSICAL MEDICINE & REHABILITATION
Payer: COMMERCIAL

## 2024-11-15 DIAGNOSIS — G43.009 MIGRAINE WITHOUT AURA AND WITHOUT STATUS MIGRAINOSUS, NOT INTRACTABLE: Primary | ICD-10-CM

## 2024-11-15 DIAGNOSIS — I10 HYPERTENSION GOAL BP (BLOOD PRESSURE) < 140/90: ICD-10-CM

## 2024-11-15 RX ORDER — NAPROXEN SODIUM 220 MG/1
220 TABLET, FILM COATED ORAL 2 TIMES DAILY PRN
COMMUNITY

## 2024-11-15 RX ORDER — CANDESARTAN 16 MG/1
16 TABLET ORAL DAILY
Qty: 90 TABLET | Refills: 2 | Status: SHIPPED | OUTPATIENT
Start: 2024-11-15

## 2024-11-15 RX ORDER — ACETAMINOPHEN 500 MG
500-1000 TABLET ORAL EVERY 6 HOURS PRN
COMMUNITY

## 2024-11-15 NOTE — PATIENT INSTRUCTIONS
"Recommendations from today's MTM visit:                                                       Continue with riboflavin 400mg once daily, candesartan 16mg once daily and propranolol 60mg once daily  Continue to take sumatriptan at the onset of a migraine but limit to less than 9 days a month to prevent rebound headaches  Continue to take acetaminophen and naproxen as needed for mild to moderate migraine pain. Limit to less than 14 days a month to prevent rebound headaches   Take Nurtec 75mg at the onset of a moderate to severe migraine. Insurance usually limites to 8 tablets a month but does not have the same risk of rebound headache.     Follow-up: 1/17/2025 at 2:00 PM via video chat    It was great speaking with you today.  I value your experience and would be very thankful for your time in providing feedback in our clinic survey. In the next few days, you may receive an email or text message from [employer] with a link to a survey related to your  clinical pharmacist.\"     To schedule another MTM appointment, please call the clinic directly or you may call the MTM scheduling line at [phone] or toll-free at [phone].     My Clinical Pharmacist's contact information:                                                      Please feel free to contact me with any questions or concerns you have.      Tatum Dickey, PharmD, BCACP  Medication Therapy Management Pharmacist   [facility]     "

## 2024-11-15 NOTE — PROGRESS NOTES
Medication Therapy Management (MTM) Encounter    ASSESSMENT:                            Medication Adherence/Access: No issues identified.    Headache   Migraine:    Patient has been able to reduce her sumatriptan use over the past month to less than 9 days per month. Her current preventative regimen seems to be effective at reducing her migraine days. Reviewed taking sumatriptan for severe migraine days, Nurtec for moderate to severe migraine days, and acetaminophen and naproxen for mild to moderate migraine days. Reviewed limiting acute treatments to avoid medication over use headaches. If she finds Nurtec beneficial for migraine treatment could consider this as a preventative option.     PLAN:                            Continue with riboflavin 400mg once daily, candesartan 16mg once daily and propranolol 60mg once daily  Continue to take sumatriptan at the onset of a migraine but limit to less than 9 days a month to prevent rebound headaches  Continue to take acetaminophen and naproxen as needed for mild to moderate migraine pain. Limit to less than 14 days a month to prevent rebound headaches   Take Nurtec 75mg at the onset of a moderate to severe migraine. Insurance usually limites to 8 tablets a month but does not have the same risk of rebound headache.     Follow-up: 1/17/2025 at 2:00 PM via video chat    SUBJECTIVE/OBJECTIVE:                          Radhika Huitron is a 64 year old female seen for a follow-up visit.       Reason for visit: Migraine medication follow-up.    Allergies/ADRs: Reviewed in chart  Past Medical History: Reviewed in chart  Tobacco: She reports that she has never smoked. She has never used smokeless tobacco.  Alcohol: Less than 1 beverage / month    Medication Adherence/Access: no issues reported.    Headache   Migraine:   Preventive medications  -Propranolol ER 60 mg once daily at dinner time (she tried reducing to 20mg once daily but blood pressure increased and migraines  returned)  -Candesartan 16mg once daily in the morning     This combination has been helpful at reducing her migraines. Her current timing of these medications is also been the most helpful      Acute medications  -Sumatriptan 50 mg at onset of migraine. Does not need to repeat a second dose and lasts about 12 hours. She has been able to reduce to less than 9 days per month  -Acetaminophen 500mg every 6 hours as needed - needing once or twice per month  -Naproxen 220mg every 12 hours as needed - finding this helpful   -Nurtec 75mg once daily as needed at the onset of a migraine.     Patient reports her migraines have reduced. She has not needed to take sumatriptan as much. She has not needed to take Nurtec yet because of the improvement in her pain.     Today's Vitals: There were no vitals taken for this visit.  ----------------    I spent 18 minutes with this patient today. I offer these suggestions for consideration by Dr. Hoffman. A copy of the visit note was provided to the patient's provider(s).    A summary of these recommendations was sent via [employer].    Tatum Dickey, PharmD, BCACP  Medication Therapy Management Pharmacist   [facility]    Telemedicine Visit Details  The patient's medications can be safely assessed via a telemedicine encounter.  Type of service:  Telephone visit  Originating Location (pt. Location): Home  Distant Location (provider location):  Off-site  Start Time:  2:00 PM  End Time: 2:18 PM     Medication Therapy Recommendations  No medication therapy recommendations to display

## 2025-02-06 ENCOUNTER — MYC MEDICAL ADVICE (OUTPATIENT)
Dept: FAMILY MEDICINE | Facility: CLINIC | Age: 65
End: 2025-02-06
Payer: COMMERCIAL

## 2025-02-06 DIAGNOSIS — G43.009 MIGRAINE WITHOUT AURA AND WITHOUT STATUS MIGRAINOSUS, NOT INTRACTABLE: Primary | ICD-10-CM

## 2025-02-06 RX ORDER — PROPRANOLOL HCL 20 MG
20 TABLET ORAL 2 TIMES DAILY
Qty: 60 TABLET | Refills: 1 | Status: SHIPPED | OUTPATIENT
Start: 2025-02-06

## 2025-04-29 ENCOUNTER — VIRTUAL VISIT (OUTPATIENT)
Dept: PHARMACY | Facility: CLINIC | Age: 65
End: 2025-04-29
Attending: PHYSICAL MEDICINE & REHABILITATION
Payer: COMMERCIAL

## 2025-04-29 DIAGNOSIS — G43.009 MIGRAINE WITHOUT AURA AND WITHOUT STATUS MIGRAINOSUS, NOT INTRACTABLE: Primary | ICD-10-CM

## 2025-04-29 NOTE — PATIENT INSTRUCTIONS
"Recommendations from today's MTM visit:                                                      Continue with propranolol ER 60mg daily, candesartan 16mg daily, and riboflavin 400mg daily for migraine prevention.   Could consider adding coenzyme q10 300mg daily for prevention  Other preventative options include:   Injectable CGRP inhibitors: Ajovy, Aimovig, and Emgality   Oral CGRP inhibitor: Qulipta  If Nurtec becomes too expensive and you don't believe it is helpful, you can stop the medication     Follow-up:   Appointments in Next Year      Jul 30, 2025 11:00 AM  Pharmacist Visit with Tatum Dickey [facility] MTM ([facility] [place] ) [phone]     Sep 03, 2025 10:30 AM  (Arrive by 10:10 AM)  MEDICARE ANNUAL WELLNESS VISIT with Doreen Grigsby MD PhD  [facility] ([facility] ) [phone]            It was great speaking with you today.  I value your experience and would be very thankful for your time in providing feedback in our clinic survey. In the next few days, you may receive an email or text message from [employer] with a link to a survey related to your  clinical pharmacist.\"     To schedule another MTM appointment, please call the clinic directly or you may call the MTM scheduling line at [phone] or toll-free at [phone].     My Clinical Pharmacist's contact information:                                                      Please feel free to contact me with any questions or concerns you have.      Tatum Dickey, PharmD, BCACP  Medication Therapy Management Pharmacist   [facility]     "

## 2025-04-29 NOTE — PROGRESS NOTES
Medication Therapy Management (MTM) Encounter    ASSESSMENT:                            Medication Adherence/Access: No issues identified.    Headache   Migraine:    Patient is still experiencing a few migraines weekly with current medication regimen. She tried a slight dose reduction of propranolol due to fatigue but then migraines were worse. Patient is hesitant to try any medications that could cause memory loss. The next best option to add would likely be an injectable CGRP mab or an oral CGRP inhibitor. Patient will be going on Medicare soon so cost of these medications could be a concern. Will provide the names of medications to patient to provider to her  to get a cost estimate. For now, patient could consider trying conezyme q10 300mg daily for migraine prevention. Her current acute treatment regimen seems to be appropriate and effective except for Nurtec which can be expensive for patient. If it does become cost prohibited, then patient can stop medication.    PLAN:                            Continue with propranolol ER 60mg daily, candesartan 16mg daily, and riboflavin 400mg daily for migraine prevention.   Could consider adding coenzyme q10 300mg daily for prevention  Other preventative options include:   Injectable CGRP inhibitors: Ajovy, Aimovig, and Emgality   Oral CGRP inhibitor: Qulipta  If Nurtec becomes too expensive and you don't believe it is helpful, you can stop the medication     Follow-up:   Appointments in Next Year      Jul 30, 2025 11:00 AM  Pharmacist Visit with Tatum Dickey RPH  [facility] MTM ([facility] ) [phone]     Sep 03, 2025 10:30 AM  (Arrive by 10:10 AM)  MEDICARE ANNUAL WELLNESS VISIT with Doreen Grigsby MD PhD  [facility] ([facility] ) [phone]            SUBJECTIVE/OBJECTIVE:                          Radhika Huitron is a 64 year old female seen for a  follow-up visit.       Reason for visit: migraine medication follow-up .    Allergies/ADRs: Reviewed in chart  Past Medical History: Reviewed in chart  Tobacco: She reports that she has never smoked. She has never used smokeless tobacco.  Alcohol: Less than 1 beverage / month    Medication Adherence/Access: no issues reported.    Headache   Migraine:   Preventive medications  -Propranolol ER 60 mg once daily at dinner time  Tried the propranolol 20mg twice daily and felt like a migraine was always on the verge of happening. She is back to the ER formulation and still feels slightly fatigued but it does help prevent migraines   -Candesartan 16mg once daily in the morning  -riboflavin 400mg once daily      Acute medications  -Sumatriptan 50 mg at onset of migraine. Does not need to repeat a second dose and lasts about 12 hours. She has been able to reduce to less than 9 days per month  -Acetaminophen 500mg every 6 hours as needed - needing once or twice per month  -Naproxen 220mg every 12 hours as needed - rarely takes because not as helpful as acetaminophen  -Nurtec 75mg once daily as needed at the onset of a migraine. This is very expensive and not always the most helpful. She takes if the sumatriptan is not fulling getting rid of her headache.     Declines medications that could affect memory like amitriptyline or topiramate    Migraines are about the same as last visit. She is getting about 2-3 migraines per week.     Patient will be going on Medicare soon    Today's Vitals: There were no vitals taken for this visit.  ----------------    I spent 17 minutes with this patient today. I offer these suggestions for consideration by Dr. Hoffman. A copy of the visit note was provided to the patient's provider(s).    A summary of these recommendations was sent via [employer].    Tatum Dickey, PharmD, BCACP  Medication Therapy Management Pharmacist   [facility]    Telemedicine Visit Details  The patient's  medications can be safely assessed via a telemedicine encounter.  Type of service:  Video Conference via [employer]  Originating Location (pt. Location): Home  Distant Location (provider location):  Off-site  Start Time:  1:58 PM  End Time: 2:15 PM     Medication Therapy Recommendations  No medication therapy recommendations to display            Today's Vitals: There were no vitals taken for this visit.  ----------------      I spent 53 minutes with this patient today. I offer these suggestions for consideration by Dr. Hoffman.     A summary of these recommendations was sent via [employer].    Tatum Dickey, PharmD, BCACP  Medication Therapy Management Pharmacist   [facility]      Telemedicine Visit Details  The patient's medications can be safely assessed via a telemedicine encounter.  Type of service:  Video Conference via [facility]  Originating Location (pt. Location): Home  Distant Location (provider location):  Off-site  Start Time:  1:30 PM  End Time:  1:53 PM     Medication Therapy Recommendations  No medication therapy recommendations to display

## 2025-06-24 ENCOUNTER — VIRTUAL VISIT (OUTPATIENT)
Dept: FAMILY MEDICINE | Facility: CLINIC | Age: 65
End: 2025-06-24
Payer: MEDICARE

## 2025-06-24 DIAGNOSIS — K57.92 ACUTE DIVERTICULITIS: ICD-10-CM

## 2025-06-24 DIAGNOSIS — G43.009 MIGRAINE WITHOUT AURA AND WITHOUT STATUS MIGRAINOSUS, NOT INTRACTABLE: Primary | ICD-10-CM

## 2025-06-24 PROCEDURE — 98006 SYNCH AUDIO-VIDEO EST MOD 30: CPT | Performed by: INTERNAL MEDICINE

## 2025-06-24 RX ORDER — NADOLOL 20 MG/1
20 TABLET ORAL DAILY
Qty: 30 TABLET | Refills: 0 | Status: SHIPPED | OUTPATIENT
Start: 2025-06-24

## 2025-06-24 NOTE — PROGRESS NOTES
"  If patient has telephone visit, have they been educated on video visit as preferred visit method and offered to change to video visit? NOT APPLICABLE        Instructions Relayed to Patient by Virtual Roomer:     Patient is active on [employer]:   Relayed following to patient: \"It looks like you are active on [employer], are you able to join the visit this way? If not, do you need us to send you a link now or would you like your provider to send a link via text or email when they are ready to initiate the visit?\"      Patient Confirmed they will join visit via: [facility]  Reminded patient to ensure they were logged on to virtual visit by arrival time listed.   Asked if patient has flexibility to initiate visit sooner than arrival time: patient is unable to initiate visit earlier than arrival time     If pediatric virtual visit, ensured pediatric patient along with parent/guardian will be present for video visit.     Patient offered the website [email or web address] and/or phone number to [employer] Help line: [phone]      Answers submitted by the patient for this visit:  Hypertension Visit (Submitted on 6/24/2025)  Chief Complaint: Chronic problems general questions HPI Form  Do you check your blood pressure regularly outside of the clinic?: Yes  Are your blood pressures ever more than 140 on the top number (systolic) OR more than 90 on the bottom number (diastolic)? (For example, greater than 140/90): No  Are you following a low salt diet?: Yes  Migraine Visit Questionnaire (Submitted on 6/24/2025)  Chief Complaint: Chronic problems general questions HPI Form  Headache Symptoms are: no change  How often are you getting headaches or migraines? : 3 days a week  Are you able to do normal daily activities when you have a migraine?: No  Migraine Rescue/Relief Medications:: Tylenol, sumatriptan (Imitrex), other  Effectiveness of rescue/relief medications:: The relief is inconsistent  Migraine Preventative " Medications:: no medications to prevent migraines  ER or UC Visits:: 0 times  General Questionnaire (Submitted on 6/24/2025)  Chief Complaint: Chronic problems general questions HPI Form  What is the reason for your visit today? : Replace or remove propranolol 60 ER--too many side effects ie constipation, fatigue etc. but now acutely throbbing rt quadrant abdominal pain since Saturday.  How many servings of fruits and vegetables do you eat daily?: 4 or more  On average, how many sweetened beverages do you drink each day (Examples: soda, juice, sweet tea, etc.  Do NOT count diet or artificially sweetened beverages)?: 0  How many minutes a day do you exercise enough to make your heart beat faster?: 20 to 29  How many days a week do you exercise enough to make your heart beat faster?: 3 or less  How many days per week do you miss taking your medication?: 0  Questionnaire about: Chronic problems general questions HPI Form (Submitted on 6/24/2025)  Chief Complaint: Chronic problems general questions HPI Form  Radhika is a 64 year old who is being evaluated via a billable video visit.    How would you like to obtain your AVS? MyChart  If the video visit is dropped, the invitation should be resent by: Text to cell phone: [phone]  Will anyone else be joining your video visit? No      Assessment & Plan     Migraine without aura and without status migrainosus, not intractable:  - Propranolol is effective for migraine prevention but causes significant gastrointestinal side effects and fatigue. Nadolol is considered as an alternative due to its similar mechanism.  - Switch from propranolol to nadolol 20 mg daily. Monitor blood pressure to ensure it remains stable. Prescription sent to [facility] in [place].    Acute diverticulitis:  - Diverticulitis suspected due to pain in the right lower quadrant and family history. Afebrile reported.  - Prescribe Augmentin to be taken twice daily. Prescription sent to [facility] in [facility]  [place]. If symptoms persist, seek local medical evaluation.  - Risks and side effects: Augmentin may be hard on the stomach. Patient reported tolerating this in the past and knows to use probiotic while on antibiotic.      Follow-up  Return in September as scheduled, sooner if symptoms not resolved after she returns.    The longitudinal plan of care for the diagnosis(es)/condition(s) as documented were addressed during this visit. Due to the added complexity in care, I will continue to support Radhika in the subsequent management and with ongoing continuity of care.       Subjective   Radhika is a 64 year old, presenting for the following health issues:  Hypertension and Headache        6/24/2025     9:21 AM   Additional Questions   Roomed by Tariq   Accompanied by self       Video Start Time: 10:08 AM    Radhika Huitron is a 64 year old female who has Migraine without aura and without status migrainosus, not intractable and Hypertension goal BP (blood pressure) < 140/90 on their pertinent problem list.      History of Present Illness       Hypertension: She presents for follow up of hypertension.  She does check blood pressure  regularly outside of the clinic. Outpatient blood pressures have not been over 140/90. She follows a low salt diet.     Headaches:   Since the patient's last clinic visit, headaches are: no change  The patient is getting headaches:  3 days a week  She is not able to do normal daily activities when she has a migraine.  The patient is taking the following rescue/relief medications:  Tylenol, sumatriptan (Imitrex) and other   Patient states \"The relief is inconsistent\" from the rescue/relief medications.   The patient is taking the following medications to prevent migraines:  No medications to prevent migraines  In the past 4 weeks, the patient has gone to an Urgent Care or Emergency Room 0 times times due to headaches.    Reason for visit:  Replace or remove propranolol 60 ER--too many side " "effects ie constipation, fatigue etc. but now acutely throbbing rt quadrant abdominal pain since Saturday.    She eats 4 or more servings of fruits and vegetables daily.She consumes 0 sweetened beverage(s) daily.She exercises with enough effort to increase her heart rate 20 to 29 minutes per day.  She exercises with enough effort to increase her heart rate 3 or less days per week.   She is taking medications regularly.   - Radhika Huitron, 64-year-old female.  - Experiencing constipation and tiredness related to propranolol use since August.  - Propranolol causing gut issues, nausea, and constipation with \"rabbit pellet\" stools.  - Fatigue and inability to increase heart rate, affecting exercise capability.  - Perspiration increased on propranolol.  - Tried Miralax for constipation, but it upset her stomach.  - Increased fiber intake through vegetables, which helped.  - Propranolol effective for migraine prevention but with significant gastrointestinal side effects and fatigue.  - Previous trials of atenolol and metoprolol not effective for migraines.  - acute onset RLQ abdominal pain waking her up nightly since Friday night. No fever but pain is rather intense.  - No history of diverticulitis, but diverticula found in ascending and sigmoid colon during 2020 colonoscopy.  - Sister had diverticulitis with similar symptoms.   - Will be flying out of town and would like to treat with empiric antibiotic         Review of Systems  Constitutional, HEENT, cardiovascular, pulmonary, gi and gu systems are negative, except as otherwise noted.      Objective           Vitals:  No vitals were obtained today due to virtual visit.    Physical Exam   GENERAL: alert and no distress  EYES: Eyes grossly normal to inspection.  No discharge or erythema, or obvious scleral/conjunctival abnormalities.  RESP: No audible wheeze, cough, or visible cyanosis.    Abd: patient marked RLQ with a pen as area of pain.  SKIN: Visible skin clear. No " significant rash, abnormal pigmentation or lesions.  NEURO: Cranial nerves grossly intact.  Mentation and speech appropriate for age.  PSYCH: Appropriate affect, tone, and pace of words        Video-Visit Details    Type of service:  Video Visit   Video End Time:10:31 AM  Originating Location (pt. Location): Home    Distant Location (provider location):  Off-site  Platform used for Video Visit: Gail  Signed Electronically by: Doreen Grigsby MD PhD

## 2025-07-08 ENCOUNTER — HOSPITAL ENCOUNTER (OUTPATIENT)
Dept: CT IMAGING | Facility: HOSPITAL | Age: 65
Discharge: HOME | End: 2025-07-08
Attending: EMERGENCY MEDICINE
Payer: MEDICARE

## 2025-07-08 ENCOUNTER — OFFICE VISIT (OUTPATIENT)
Dept: PEDIATRICS | Facility: CLINIC | Age: 65
End: 2025-07-08
Payer: MEDICARE

## 2025-07-08 VITALS
DIASTOLIC BLOOD PRESSURE: 93 MMHG | OXYGEN SATURATION: 99 % | HEART RATE: 65 BPM | TEMPERATURE: 97.9 F | RESPIRATION RATE: 14 BRPM | SYSTOLIC BLOOD PRESSURE: 175 MMHG | BODY MASS INDEX: 20.81 KG/M2 | WEIGHT: 128.9 LBS

## 2025-07-08 DIAGNOSIS — R10.31 RLQ ABDOMINAL PAIN: Primary | ICD-10-CM

## 2025-07-08 DIAGNOSIS — R10.31 RLQ ABDOMINAL PAIN: ICD-10-CM

## 2025-07-08 DIAGNOSIS — K57.32 DIVERTICULITIS OF COLON: ICD-10-CM

## 2025-07-08 LAB
ALBUMIN SERPL-MCNC: 3.9 G/DL (ref 3.4–5)
ALBUMIN UR-MCNC: NEGATIVE MG/DL
ALP SERPL-CCNC: 63 U/L (ref 40–150)
ALT SERPL W P-5'-P-CCNC: 31 U/L (ref 0–50)
ANION GAP SERPL CALCULATED.3IONS-SCNC: 13 MMOL/L (ref 3–14)
APPEARANCE UR: CLEAR
AST SERPL W P-5'-P-CCNC: 33 U/L (ref 0–45)
BACTERIA #/AREA URNS HPF: ABNORMAL /HPF
BILIRUB SERPL-MCNC: 0.9 MG/DL (ref 0.2–1.3)
BILIRUB UR QL STRIP: NEGATIVE
BUN SERPL-MCNC: 18 MG/DL (ref 7–30)
CALCIUM SERPL-MCNC: 9.5 MG/DL (ref 8.5–10.1)
CHLORIDE BLD-SCNC: 102 MMOL/L (ref 94–109)
CO2 SERPL-SCNC: 30 MMOL/L (ref 20–32)
COLOR UR AUTO: YELLOW
CREAT SERPL-MCNC: 0.8 MG/DL (ref 0.52–1.04)
EGFRCR SERPLBLD CKD-EPI 2021: 81 ML/MIN/1.73M2
ERYTHROCYTE [DISTWIDTH] IN BLOOD BY AUTOMATED COUNT: 12 % (ref 10–15)
GLUCOSE BLD-MCNC: 79 MG/DL (ref 70–99)
GLUCOSE UR STRIP-MCNC: NEGATIVE MG/DL
HCT VFR BLD AUTO: 42.8 % (ref 35–47)
HGB BLD-MCNC: 14.5 G/DL (ref 11.7–15.7)
HGB UR QL STRIP: NEGATIVE
KETONES UR STRIP-MCNC: NEGATIVE MG/DL
LEUKOCYTE ESTERASE UR QL STRIP: NEGATIVE
MCH RBC QN AUTO: 31 PG (ref 26.5–33)
MCHC RBC AUTO-ENTMCNC: 33.9 G/DL (ref 31.5–36.5)
MCV RBC AUTO: 92 FL (ref 78–100)
NITRATE UR QL: NEGATIVE
PH UR STRIP: 6 [PH] (ref 5–8)
PLATELET # BLD AUTO: 287 10E3/UL (ref 150–450)
POTASSIUM BLD-SCNC: 3.9 MMOL/L (ref 3.4–5.3)
PROT SERPL-MCNC: 7.4 G/DL (ref 6.8–8.8)
RBC # BLD AUTO: 4.68 10E6/UL (ref 3.8–5.2)
RBC #/AREA URNS AUTO: ABNORMAL /HPF
SODIUM SERPL-SCNC: 145 MMOL/L (ref 135–145)
SP GR UR STRIP: <=1.005 (ref 1–1.03)
SQUAMOUS #/AREA URNS AUTO: ABNORMAL /LPF
UROBILINOGEN UR STRIP-ACNC: 0.2 E.U./DL
WBC # BLD AUTO: 6.7 10E3/UL (ref 4–11)
WBC #/AREA URNS AUTO: ABNORMAL /HPF

## 2025-07-08 PROCEDURE — 74177 CT ABD & PELVIS W/CONTRAST: CPT

## 2025-07-08 PROCEDURE — 99213 OFFICE O/P EST LOW 20 MIN: CPT | Performed by: EMERGENCY MEDICINE

## 2025-07-08 PROCEDURE — 250N000009 HC RX 250: Performed by: EMERGENCY MEDICINE

## 2025-07-08 PROCEDURE — 85027 COMPLETE CBC AUTOMATED: CPT | Performed by: EMERGENCY MEDICINE

## 2025-07-08 PROCEDURE — 36415 COLL VENOUS BLD VENIPUNCTURE: CPT | Performed by: EMERGENCY MEDICINE

## 2025-07-08 PROCEDURE — 81001 URINALYSIS AUTO W/SCOPE: CPT | Performed by: EMERGENCY MEDICINE

## 2025-07-08 PROCEDURE — 3080F DIAST BP >= 90 MM HG: CPT | Performed by: EMERGENCY MEDICINE

## 2025-07-08 PROCEDURE — 250N000011 HC RX IP 250 OP 636: Performed by: EMERGENCY MEDICINE

## 2025-07-08 PROCEDURE — 3077F SYST BP >= 140 MM HG: CPT | Performed by: EMERGENCY MEDICINE

## 2025-07-08 PROCEDURE — 80053 COMPREHEN METABOLIC PANEL: CPT | Performed by: EMERGENCY MEDICINE

## 2025-07-08 RX ORDER — IOPAMIDOL 755 MG/ML
64 INJECTION, SOLUTION INTRAVASCULAR ONCE
Status: COMPLETED | OUTPATIENT
Start: 2025-07-08 | End: 2025-07-08

## 2025-07-08 RX ORDER — AMOXICILLIN AND CLAVULANATE POTASSIUM 500; 125 MG/1; MG/1
1 TABLET, FILM COATED ORAL 3 TIMES DAILY
Qty: 21 TABLET | Refills: 0 | Status: SHIPPED | OUTPATIENT
Start: 2025-07-08 | End: 2025-07-15

## 2025-07-08 RX ADMIN — IOPAMIDOL 64 ML: 755 INJECTION, SOLUTION INTRAVENOUS at 10:11

## 2025-07-08 RX ADMIN — SODIUM CHLORIDE 100 ML: 9 INJECTION, SOLUTION INTRAVENOUS at 10:11

## 2025-07-08 NOTE — PROGRESS NOTES
Acute and Diagnostic Services Clinic Visit    {PROVIDER CHARTING PREFERENCE:925717}    Subjective   Radhika is a 64 year old, presenting for the following health issues:  Abdominal Pain (RLQ)  {(!) Visit Details have not yet been documented.  Please enter Visit Details and then use this list to pull in documentation. (Optional):936103}  HPI      Abdominal/Flank Pain  Onset/Duration: low grade RLQ pain last Aug 2024 (on propranolol) - acute RLQ pain end of May 2025 (nadolol currently - it's not working for the pt)  Description:   Character: Dull ache and throbbing  Location: right lower quadrant  Radiation: Back, Pelvic region, and towards RUQ  Intensity: 6/10  Progression of Symptoms:  worsening  Accompanying Signs & Symptoms:  Fever/chills: no   Gas/Bloating: YES  Nausea: YES - dx of diverticulitis June 23, 2025 - pt felt great for 3 days with antibiotics, and came back again.  Vomitting: no   Diarrhea: no   Constipation:YES - pt feels like she can't fully complete her BM - started with the propranolol  Dysuria: no            Hematuria: no            Frequency: YES- a little more           Incontinence of urine: no   History:            Last bowel movement: today  Trauma: no   Previous similar pain: YES   Previous tests done: MRI - 2-3 yrs ago (pt has lots of food allergy in that same area RLQ)           Previous Abdominal surgery: no   Precipitating factors:   Does the pain change with:     Food: YES- gets better for the pt     Bowel Movement: no     Urination: no              Other factors: no   Therapies tried and outcome:  dicyclomine (prescribed a couple yrs ago) (took it last night) + al glutamine (this morning)    When food last eaten: this morning      {ROS Picklists (Optional):348432}      Objective    BP (!) 175/93   Pulse 65   Temp 97.9  F (36.6  C) (Oral)   Resp 14   Wt 58.5 kg (128 lb 14.4 oz)   SpO2 99%   BMI 20.81 kg/m    Body mass index is 20.81 kg/m .  Physical Exam   {Exam List  (Optional):595495}    {Diagnostic Test Results (Optional):018542}        Signed Electronically by: David Galvez MD  {Email feedback regarding this note to [email or web address]   :[id number]}

## 2025-07-08 NOTE — PROGRESS NOTES
Impression:  Abdominal pain with thickening of the right colon wall.  Could be due to diverticulitis since she is known to have a diverticulum on the right side of the colon.  But she has also had polyps in the past and a family history of colon cancer so she definitely needs follow-up colonoscopy once her symptoms are resolved    Plan:  Augmentin for 7 days, clear liquid diet 24 hours for 24 hours and then advance diet as tolerated.  Follow-up with primary care in 1 week if not improved.  She is due for a follow-up colonoscopy in 5 years so she should go ahead and schedule that when she is done with the antibiotics.      Chief Complaint:  Patient presents with:  Abdominal Pain: RLQ         HPI:   Radhika Huitron is a 64 year old female who presents to this clinic for the evaluation of right lower quadrant abdominal pain.  Patient has had some constipation and intermittent abdominal pain for the past several months since starting on propranolol for migraines.  She believes the propranolol caused constipation.  She was switched to nadolol but continues to have the constipation.  Starting a couple months ago she started to have increasing episodes of abdominal pain associated with nausea.  It got worse before she was going to go on a trip at the end of June so she was started on Augmentin for 7 days.  The pain completely resolved at that time but when she stopped the antibiotics a few days ago, the pain returned.  The pain is located in the right lower quadrant and occasionally migrates to different areas.  Has been gradually worsening for the past 6 days.  She has constipation but no diarrhea.  Had 1 episode of blood in the stools but does have hemorrhoids.  Has had some intermittent nausea but no vomiting.  No dysuria or hematuria or vaginal bleeding or discharge.      PMH:   Past Medical History:   Diagnosis Date    Arthritis     Cancer (H)     Hypertension      Past Surgical History:   Procedure Laterality Date     BIOPSY  2006    Melanoma    COLONOSCOPY  2020    ORTHOPEDIC SURGERY  2021    Meniscus Repair         ROS:  All other systems negative    Meds:    Current Outpatient Medications:     acetaminophen (TYLENOL) 500 MG tablet, Take 500-1,000 mg by mouth every 6 hours as needed for mild pain., Disp: , Rfl:     azelastine 137 MCG/SPRAY SOLN, [place] 1 spray into both nostrils 2 times daily., Disp: , Rfl:     candesartan (ATACAND) 16 MG tablet, TAKE ONE TABLET BY MOUTH ONE TIME DAILY, Disp: 90 tablet, Rfl: 2    doxepin (SINEQUAN) 10 MG/ML (HIGH CONC) solution, Take 10 mg by mouth at bedtime., Disp: , Rfl:     famotidine (PEPCID) 20 MG tablet, Take 20 mg by mouth nightly as needed., Disp: , Rfl:     L-Theanine 100 MG CAPS, Take 100 mg by mouth daily., Disp: , Rfl:     loratadine (CLARITIN) 10 MG tablet, Take 10 mg by mouth daily., Disp: , Rfl:     magnesium glycinate 100 MG CAPS capsule, Take 200 mg by mouth daily., Disp: , Rfl:     melatonin 3 MG tablet, Take 3 mg by mouth nightly as needed for sleep., Disp: , Rfl:     METHYLCOBALAMIN IJ, Inject 12.5 mg as directed. Three times weekly, Disp: , Rfl:     montelukast (SINGULAIR) 10 MG tablet, Take 10 mg by mouth at bedtime., Disp: , Rfl:     multivitamin w/minerals (THERA-VIT-M) tablet, Take 1 tablet by mouth daily., Disp: , Rfl:     nadolol (CORGARD) 20 MG tablet, Take 1 tablet (20 mg) by mouth daily., Disp: 30 tablet, Rfl: 0    NALTREXONE HCL PO, Take 3 mg by mouth daily. pain, Disp: , Rfl:     NONFORMULARY, Fermented melatonin drops 8mg nightly, Disp: , Rfl:     Riboflavin 100 MG TABS, Take 400 mg by mouth once., Disp: , Rfl:     rimegepant (NURTEC) 75 MG ODT tablet, Place 1 tablet (75 mg) under the tongue daily as needed for migraine. Maximum of 1 tablet every 24 hours., Disp: 8 tablet, Rfl: 5    SUMAtriptan (IMITREX) 50 MG tablet, Take 1 tablet (50 mg) by mouth at onset of headache for migraine. May repeat in 2 hours. Max 4 tablets/24 hours., Disp: 27 tablet, Rfl: 3    zinc  gluconate 50 MG tablet, Take 50 mg by mouth daily., Disp: , Rfl:     propranolol ER (INDERAL LA) 60 MG 24 hr capsule, Take 1 capsule (60 mg) by mouth daily., Disp: 90 capsule, Rfl: 0  No current facility-administered medications for this visit.        Social:  Social History     Socioeconomic History    Marital status:      Spouse name: Not on file    Number of children: Not on file    Years of education: Not on file    Highest education level: Not on file   Occupational History    Not on file   Tobacco Use    Smoking status: Never    Smokeless tobacco: Never   Vaping Use    Vaping status: Never Used   Substance and Sexual Activity    Alcohol use: Yes    Drug use: Never    Sexual activity: Yes     Partners: Female     Birth control/protection: None   Other Topics Concern    Parent/sibling w/ CABG, MI or angioplasty before 65F 55M? No   Social History Narrative    Not on file     Social Drivers of Health     Financial Resource Strain: Low Risk  (10/24/2024)    Financial Resource Strain     Within the past 12 months, have you or your family members you live with been unable to get utilities (heat, electricity) when it was really needed?: No   Food Insecurity: Low Risk  (10/24/2024)    Food Insecurity     Within the past 12 months, did you worry that your food would run out before you got money to buy more?: No     Within the past 12 months, did the food you bought just not last and you didn t have money to get more?: No   Transportation Needs: Low Risk  (10/24/2024)    Transportation Needs     Within the past 12 months, has lack of transportation kept you from medical appointments, getting your medicines, non-medical meetings or appointments, work, or from getting things that you need?: No   Physical Activity: Insufficiently Active (10/24/2024)    Exercise Vital Sign     Days of Exercise per Week: 4 days     Minutes of Exercise per Session: 30 min   Stress: Stress Concern Present (10/24/2024)    [place]  [employer] - Occupational Stress Questionnaire     Feeling of Stress : To some extent   Social Connections: Unknown (10/24/2024)    Social Connection and Isolation Panel [NHANES]     Frequency of Communication with Friends and Family: Not on file     Frequency of Social Gatherings with Friends and Family: More than three times a week     Attends Latter day Services: Not on file     Active Member of Clubs or Organizations: Not on file     Attends Club or Organization Meetings: Not on file     Marital Status: Not on file   Interpersonal Safety: Low Risk  (10/25/2024)    Interpersonal Safety     Do you feel physically and emotionally safe where you currently live?: Yes     Within the past 12 months, have you been hit, slapped, kicked or otherwise physically hurt by someone?: No     Within the past 12 months, have you been humiliated or emotionally abused in other ways by your partner or ex-partner?: No   Housing Stability: Low Risk  (10/24/2024)    Housing Stability     Do you have housing? : Yes     Are you worried about losing your housing?: No   Recent Concern: Housing Stability - High Risk (9/11/2024)    Received from [facility]    Housing Stability Vital Sign     Unable to Pay for Housing in the Last Year: No     Number of Times Moved in the Last Year: 2     Homeless in the Last Year: No         Physical Exam:  Vitals:    07/08/25 0910   BP: (!) 175/93   Pulse: 65   Resp: 14   Temp: 97.9  F (36.6  C)   TempSrc: Oral   SpO2: 99%   Weight: 58.5 kg (128 lb 14.4 oz)      Patient is awake, alert, no distress  Lungs: Clear without distress  CV: Regular without murmur  Abdomen: Tenderness in the right lower quadrant, no mass or rebound tenderness, no other areas of tenderness, no CVA tenderness  Extremities: No tenderness or deformity  Skin: No lesions or rash  Neuro: Normal motor and sensory function in all extremities  Psych: Awake, alert, normally responsive      Results:    Recent Results  (from the past 24 hours)   UA with Microscopic reflex to Culture    Specimen: Urine, Midstream   Result Value Ref Range    Color Urine Yellow Colorless, Straw, Light Yellow, Yellow    Appearance Urine Clear Clear    Glucose Urine Negative Negative mg/dL    Bilirubin Urine Negative Negative    Ketones Urine Negative Negative mg/dL    Specific Gravity Urine <=1.005 1.005 - 1.030    Blood Urine Negative Negative    pH Urine 6.0 5.0 - 8.0    Protein Albumin Urine Negative Negative mg/dL    Urobilinogen Urine 0.2 0.2, 1.0 E.U./dL    Nitrite Urine Negative Negative    Leukocyte Esterase Urine Negative Negative   Comprehensive metabolic panel   Result Value Ref Range    Sodium 145 135 - 145 mmol/L    Potassium 3.9 3.4 - 5.3 mmol/L    Chloride 102 94 - 109 mmol/L    Carbon Dioxide (CO2) 30 20 - 32 mmol/L    Anion Gap 13 3 - 14 mmol/L    Urea Nitrogen 18 7 - 30 mg/dL    Creatinine 0.80 0.52 - 1.04 mg/dL    GFR Estimate 81 >60 mL/min/1.73m2    Calcium 9.5 8.5 - 10.1 mg/dL    Glucose 79 70 - 99 mg/dL    Alkaline Phosphatase 63 40 - 150 U/L    AST 33 0 - 45 U/L    ALT 31 0 - 50 U/L    Protein Total 7.4 6.8 - 8.8 g/dL    Albumin 3.9 3.4 - 5.0 g/dL    Bilirubin Total 0.9 0.2 - 1.3 mg/dL   CBC with platelets   Result Value Ref Range    WBC Count 6.7 4.0 - 11.0 10e3/uL    RBC Count 4.68 3.80 - 5.20 10e6/uL    Hemoglobin 14.5 11.7 - 15.7 g/dL    Hematocrit 42.8 35.0 - 47.0 %    MCV 92 78 - 100 fL    MCH 31.0 26.5 - 33.0 pg    MCHC 33.9 31.5 - 36.5 g/dL    RDW 12.0 10.0 - 15.0 %    Platelet Count 287 150 - 450 10e3/uL   UA Microscopic with Reflex to Culture   Result Value Ref Range    Bacteria Urine None Seen None Seen /HPF    RBC Urine None Seen 0-2 /HPF /HPF    WBC Urine 0-5 0-5 /HPF /HPF    Squamous Epithelials Urine Few (A) None Seen /LPF    Narrative    Urine Culture not indicated   CT Abdomen Pelvis w Contrast    Narrative    EXAM: CT ABDOMEN PELVIS W CONTRAST  LOCATION: [facility]  DATE:  7/8/2025    INDICATION: H O diverticulosis, RLQ pain for 2 months, responded to abx but now getting worse  COMPARISON: 6/24/2022 and 3/7/2006  TECHNIQUE: CT scan of the abdomen and pelvis was performed following injection of IV contrast. Multiplanar reformats were obtained. Dose reduction techniques were used.  CONTRAST: 64mL isovue 370    FINDINGS:   LOWER CHEST: Normal.    HEPATOBILIARY: Normal.    PANCREAS: Normal.    SPLEEN: Normal.    ADRENAL GLANDS: Normal.    KIDNEYS/BLADDER: 2 mm nonobstructing calculus in the lower pole of the right kidney.    BOWEL: There is mild wall thickening of the ascending colon, hepatic flexure, and proximal transverse colon. Normal appendix.    LYMPH NODES: Normal.    VASCULATURE: Normal.    PELVIC ORGANS: Suspected left uterine fibroid.    MUSCULOSKELETAL: Normal.      Impression    IMPRESSION:   1.  Wall thickening of the right colon may be transient due to underdistention. Normal appendix. No bowel obstruction.  2.  2 mm nonobstructing calculus in the lower pole of the right kidney.  3.  Suspected left uterine fibroid.          Recent Results (from the past 24 hours)   CT Abdomen Pelvis w Contrast    Narrative    EXAM: CT ABDOMEN PELVIS W CONTRAST  LOCATION: [facility]  DATE: 7/8/2025    INDICATION: H O diverticulosis, RLQ pain for 2 months, responded to abx but now getting worse  COMPARISON: 6/24/2022 and 3/7/2006  TECHNIQUE: CT scan of the abdomen and pelvis was performed following injection of IV contrast. Multiplanar reformats were obtained. Dose reduction techniques were used.  CONTRAST: 64mL isovue 370    FINDINGS:   LOWER CHEST: Normal.    HEPATOBILIARY: Normal.    PANCREAS: Normal.    SPLEEN: Normal.    ADRENAL GLANDS: Normal.    KIDNEYS/BLADDER: 2 mm nonobstructing calculus in the lower pole of the right kidney.    BOWEL: There is mild wall thickening of the ascending colon, hepatic flexure, and proximal transverse colon. Normal  appendix.    LYMPH NODES: Normal.    VASCULATURE: Normal.    PELVIC ORGANS: Suspected left uterine fibroid.    MUSCULOSKELETAL: Normal.      Impression    IMPRESSION:   1.  Wall thickening of the right colon may be transient due to underdistention. Normal appendix. No bowel obstruction.  2.  2 mm nonobstructing calculus in the lower pole of the right kidney.  3.  Suspected left uterine fibroid.            David Galvez MD

## 2025-07-10 ENCOUNTER — VIRTUAL VISIT (OUTPATIENT)
Dept: FAMILY MEDICINE | Facility: CLINIC | Age: 65
End: 2025-07-10
Payer: MEDICARE

## 2025-07-10 DIAGNOSIS — I10 HYPERTENSION GOAL BP (BLOOD PRESSURE) < 140/90: ICD-10-CM

## 2025-07-10 DIAGNOSIS — K57.92 ACUTE DIVERTICULITIS: Primary | ICD-10-CM

## 2025-07-10 RX ORDER — HYDROCHLOROTHIAZIDE 12.5 MG/1
12.5 TABLET ORAL DAILY
Qty: 30 TABLET | Refills: 0 | Status: SHIPPED | OUTPATIENT
Start: 2025-07-10

## 2025-07-10 NOTE — PROGRESS NOTES
"  If patient has telephone visit, have they been educated on video visit as preferred visit method and offered to change to video visit? N/A        Instructions Relayed to Patient by Virtual Roomer:     Patient is active on CallMiner:   Relayed following to patient: \"It looks like you are active on CallMiner, are you able to join the visit this way? If not, do you need us to send you a link now or would you like your provider to send a link via text or email when they are ready to initiate the visit?\"      Patient Confirmed they will join visit via: [employer] [facility]  Reminded patient to ensure they were logged on to virtual visit by arrival time listed.   Asked if patient has flexibility to initiate visit sooner than arrival time: patient is unable to initiate visit earlier than arrival time     If pediatric virtual visit, ensured pediatric patient along with parent/guardian will be present for video visit.     Patient offered the website [email or web address] and/or phone number to CallMiner Help line: [phone]      Radhika is a 64 year old who is being evaluated via a billable video visit.    How would you like to obtain your AVS? [employer] [facility]  If the video visit is dropped, the invitation should be resent by: Text to cell phone: [phone]  Will anyone else be joining your video visit? No      Assessment & Plan     Radhika was seen today for hypertension and hospital f/u.    Diagnoses and all orders for this visit:    Acute diverticulitis  -     amoxicillin-clavulanate (AUGMENTIN) 875-125 MG tablet; Take 1 tablet by mouth 2 times daily for 7 days.    Hypertension goal BP (blood pressure) < 140/90  Comments:  Add hydrochlorothiazide 12.5 mg.  Check BMP in 2 weeks. Consider increasing nadolol to 40 mg if BP is not controlled or electrolyte abnormality.  Orders:  -     hydroCHLOROthiazide 12.5 MG tablet; Take 1 tablet (12.5 mg) by mouth daily.  -     Basic metabolic panel  (Ca, Cl, CO2, Creat, Gluc, K, Na, BUN); " Future      Current CT finding of ascending colon wall thickening is pretty mild.  White blood cell count was normal.  Her symptoms have improved with treatment of Augmentin.  But not tolerating the 3 times a day dosing.  I resent Augmentin a 75/125 mg for about a day dosing for 7 days.  She tolerated this regimen well before.  I would expect a complete resolution of her symptoms with another course of treatment.    Colonoscopy in early September will be reasonable time, being 8 weeks after a diverticulitis episode.    MED REC REQUIRED  Post Medication Reconciliation Status: discharge medications reconciled and changed, per note/orders    Follow-up  Electrolyte panel in 2 weeks.  Annual visit already scheduled in early September.    The longitudinal plan of care for the diagnosis(es)/condition(s) as documented were addressed during this visit. Due to the added complexity in care, I will continue to support Radhika in the subsequent management and with ongoing continuity of care.    Ang Garrido is a 64 year old, presenting for the following health issues:  Hypertension and Hospital F/U (ADS Follow up )        7/10/2025     7:54 AM   Additional Questions   Roomed by Claudette BREWER   Accompanied by Self     Video Start Time: 8 0 5 AM    History of Present Illness       Hypertension: She presents for follow up of hypertension.  She does check blood pressure  regularly outside of the clinic. Outside blood pressures have been over 140/90. She follows a low salt diet.     Reason for visit:  CT scan results + BP med tweak    She eats 4 or more servings of fruits and vegetables daily.She consumes 0 sweetened beverage(s) daily.She exercises with enough effort to increase her heart rate 20 to 29 minutes per day.  She exercises with enough effort to increase her heart rate 3 or less days per week.   She is taking medications regularly.        ED/UC Followup:    Facility:  [facility]   Date of visit: 7/8/2025  Reason for  visit: Continued Abdominal Pain   Current Status: Would like to discuss CT Results and possible next steps.     Treated empirically for acute diverticulitis due to right lower quadrant abdominal pain before her travel on June 24, 2025.  Given Augmentin 875/125 mg twice a day for 7 days.  Patient reported resolution of pain for few days but they recurred.    Seen at [facility] on 7/8/2025, abdominal CT show mild ascending colon thickening possible transient.  No other findings.  She was given Augmentin 500/125 to be taken 3 times a day for 7 days.  She is not tolerating this very well with the 3 times a day dosing.  Had stomach discomfort.    There is no improvement in the right lower quadrant abdominal pain.  Currently the pain is very mild, barely noticeable.    She has scheduled a colonoscopy in early September.    Prior to her trip, we switched propranolol to nadolol due to side effects from propranolol.  Patient tolerated nadolol without the side effects from propranolol.  Migraine is very well-controlled with this.    Blood pressure however has been in the 150s.  She push up the dose of nadolol to 30 mg on her own without improvement in blood pressure.    She would like to consider adding hydrochlorothiazide.  Previously she was on Ziac with very good blood pressure control.  Previous PCP discontinued this medication for the potential for dehydration.  Patient did not have any side effects.    Review of Systems  Constitutional, HEENT, cardiovascular, pulmonary, gi and gu systems are negative, except as otherwise noted.      Objective           Vitals:  No vitals were obtained today due to virtual visit.    Physical Exam   GENERAL: alert and no distress  EYES: Eyes grossly normal to inspection.  No discharge or erythema, or obvious scleral/conjunctival abnormalities.  RESP: No audible wheeze, cough, or visible cyanosis.    SKIN: Visible skin clear. No significant rash, abnormal pigmentation or lesions.  NEURO: Cranial  nerves grossly intact.  Mentation and speech appropriate for age.  PSYCH: Appropriate affect, tone, and pace of words    Office Visit on 07/08/2025   Component Date Value Ref Range Status    Color Urine 07/08/2025 Yellow  Colorless, Straw, Light Yellow, Yellow Final    Appearance Urine 07/08/2025 Clear  Clear Final    Glucose Urine 07/08/2025 Negative  Negative mg/dL Final    Bilirubin Urine 07/08/2025 Negative  Negative Final    Ketones Urine 07/08/2025 Negative  Negative mg/dL Final    Specific Gravity Urine 07/08/2025 <=1.005  1.005 - 1.030 Final    Blood Urine 07/08/2025 Negative  Negative Final    pH Urine 07/08/2025 6.0  5.0 - 8.0 Final    Protein Albumin Urine 07/08/2025 Negative  Negative mg/dL Final    Urobilinogen Urine 07/08/2025 0.2  0.2, 1.0 E.U./dL Final    Nitrite Urine 07/08/2025 Negative  Negative Final    Leukocyte Esterase Urine 07/08/2025 Negative  Negative Final    Sodium 07/08/2025 145  135 - 145 mmol/L Final    Potassium 07/08/2025 3.9  3.4 - 5.3 mmol/L Final    Chloride 07/08/2025 102  94 - 109 mmol/L Final    Carbon Dioxide (CO2) 07/08/2025 30  20 - 32 mmol/L Final    Anion Gap 07/08/2025 13  3 - 14 mmol/L Final    Urea Nitrogen 07/08/2025 18  7 - 30 mg/dL Final    Creatinine 07/08/2025 0.80  0.52 - 1.04 mg/dL Final    GFR Estimate 07/08/2025 81  >60 mL/min/1.73m2 Final    Calcium 07/08/2025 9.5  8.5 - 10.1 mg/dL Final    Glucose 07/08/2025 79  70 - 99 mg/dL Final    Alkaline Phosphatase 07/08/2025 63  40 - 150 U/L Final    AST 07/08/2025 33  0 - 45 U/L Final    ALT 07/08/2025 31  0 - 50 U/L Final    Protein Total 07/08/2025 7.4  6.8 - 8.8 g/dL Final    Albumin 07/08/2025 3.9  3.4 - 5.0 g/dL Final    Bilirubin Total 07/08/2025 0.9  0.2 - 1.3 mg/dL Final    WBC Count 07/08/2025 6.7  4.0 - 11.0 10e3/uL Final    RBC Count 07/08/2025 4.68  3.80 - 5.20 10e6/uL Final    Hemoglobin 07/08/2025 14.5  11.7 - 15.7 g/dL Final    Hematocrit 07/08/2025 42.8  35.0 - 47.0 % Final    MCV 07/08/2025 92  78 -  100 fL Final    MCH 07/08/2025 31.0  26.5 - 33.0 pg Final    MCHC 07/08/2025 33.9  31.5 - 36.5 g/dL Final    RDW 07/08/2025 12.0  10.0 - 15.0 % Final    Platelet Count 07/08/2025 287  150 - 450 10e3/uL Final    Bacteria Urine 07/08/2025 None Seen  None Seen /HPF Final    RBC Urine 07/08/2025 None Seen  0-2 /HPF /HPF Final    WBC Urine 07/08/2025 0-5  0-5 /HPF /HPF Final    Squamous Epithelials Urine 07/08/2025 Few (A)  None Seen /LPF Final     Recent Results (from the past 744 hours)   CT Abdomen Pelvis w Contrast    Narrative    EXAM: CT ABDOMEN PELVIS W CONTRAST  LOCATION: [facility]  DATE: 7/8/2025    INDICATION: H O diverticulosis, RLQ pain for 2 months, responded to abx but now getting worse  COMPARISON: 6/24/2022 and 3/7/2006  TECHNIQUE: CT scan of the abdomen and pelvis was performed following injection of IV contrast. Multiplanar reformats were obtained. Dose reduction techniques were used.  CONTRAST: 64mL isovue 370    FINDINGS:   LOWER CHEST: Normal.    HEPATOBILIARY: Normal.    PANCREAS: Normal.    SPLEEN: Normal.    ADRENAL GLANDS: Normal.    KIDNEYS/BLADDER: 2 mm nonobstructing calculus in the lower pole of the right kidney.    BOWEL: There is mild wall thickening of the ascending colon, hepatic flexure, and proximal transverse colon. Normal appendix.    LYMPH NODES: Normal.    VASCULATURE: Normal.    PELVIC ORGANS: Suspected left uterine fibroid.    MUSCULOSKELETAL: Normal.      Impression    IMPRESSION:   1.  Wall thickening of the right colon may be transient due to underdistention. Normal appendix. No bowel obstruction.  2.  2 mm nonobstructing calculus in the lower pole of the right kidney.  3.  Suspected left uterine fibroid.            Video-Visit Details    Type of service:  Video Visit   Video End Time:8:28 AM  Originating Location (pt. Location): Home    Distant Location (provider location):  Off-site  Platform used for Video Visit: Gail  Signed Electronically by:  Doreen Grigsby MD PhD    Chart documentation was done in part with Dragon Voice Recognition software and AI tools. Although reviewed after completion, some word and grammatical errors may remain.

## 2025-07-14 ENCOUNTER — MYC MEDICAL ADVICE (OUTPATIENT)
Dept: FAMILY MEDICINE | Facility: CLINIC | Age: 65
End: 2025-07-14
Payer: MEDICARE

## 2025-07-14 DIAGNOSIS — I10 HYPERTENSION GOAL BP (BLOOD PRESSURE) < 140/90: Primary | ICD-10-CM

## 2025-07-14 DIAGNOSIS — G43.009 MIGRAINE WITHOUT AURA AND WITHOUT STATUS MIGRAINOSUS, NOT INTRACTABLE: ICD-10-CM

## 2025-07-15 RX ORDER — NADOLOL 20 MG/1
TABLET ORAL
Qty: 45 TABLET | Refills: 0 | Status: SHIPPED | OUTPATIENT
Start: 2025-07-15 | End: 2025-07-17

## 2025-07-15 RX ORDER — BISOPROLOL FUMARATE AND HYDROCHLOROTHIAZIDE 2.5; 6.25 MG/1; MG/1
1 TABLET ORAL DAILY
Qty: 90 TABLET | Refills: 0 | Status: SHIPPED | OUTPATIENT
Start: 2025-07-15 | End: 2025-10-13

## 2025-07-15 RX ORDER — NADOLOL 20 MG/1
10 TABLET ORAL DAILY
Qty: 45 TABLET | Refills: 0 | Status: CANCELLED | OUTPATIENT
Start: 2025-07-15 | End: 2025-10-13

## 2025-07-17 RX ORDER — NADOLOL 20 MG/1
TABLET ORAL
Qty: 45 TABLET | Refills: 0 | Status: SHIPPED | OUTPATIENT
Start: 2025-07-17

## 2025-07-21 ENCOUNTER — PATIENT OUTREACH (OUTPATIENT)
Dept: CARE COORDINATION | Facility: CLINIC | Age: 65
End: 2025-07-21
Payer: MEDICARE

## 2025-07-23 ENCOUNTER — PATIENT OUTREACH (OUTPATIENT)
Dept: CARE COORDINATION | Facility: CLINIC | Age: 65
End: 2025-07-23
Payer: MEDICARE

## 2025-07-30 ENCOUNTER — VIRTUAL VISIT (OUTPATIENT)
Dept: PHARMACY | Facility: CLINIC | Age: 65
End: 2025-07-30
Attending: PHYSICAL MEDICINE & REHABILITATION

## 2025-07-30 DIAGNOSIS — G43.009 MIGRAINE WITHOUT AURA AND WITHOUT STATUS MIGRAINOSUS, NOT INTRACTABLE: Primary | ICD-10-CM

## 2025-07-30 RX ORDER — UBIDECARENONE 100 MG
300 CAPSULE ORAL DAILY
COMMUNITY

## 2025-07-30 NOTE — Clinical Note
Ced Lawson I had a f/up with pt today for migraine medications. She will be establishing with neurology clinic so I recommended preventative medications be discussed at this visit.   She has been taking dicyclomine for some GI issues and has found it helpful for neck pain and tightness and reducing migraines. She may find a skeletal muscle relaxant, like cyclobenzaprine even more helpful. I did recommend a follow-up with PM&R clinic for further workup for this, but until able to establish with neurology and see Dr. Hoffman, would you be okay with prescribing cyclobenzaprine 5mg TID PRN for pt?  Thanks!

## 2025-07-30 NOTE — PATIENT INSTRUCTIONS
"Recommendations from today's MTM visit:                                                      Continue with current medications  Try to get an appointment with Dr. Hoffman   You have an appointment with Dr. Hallman on 8/20  I will discuss possibly adding a muscle relaxer, cyclobenzaprine, with your primary care provider to see if that helps with any of the neck pain and stiffness     Follow-up: 3-6 months or sooner depending on medication questions or concerns.    It was great speaking with you today.  I value your experience and would be very thankful for your time in providing feedback in our clinic survey. In the next few days, you may receive an email or text message from [employer] with a link to a survey related to your  clinical pharmacist.\"     To schedule another MTM appointment, please call the clinic directly or you may call the MTM scheduling line at [phone] or toll-free at [phone].     My Clinical Pharmacist's contact information:                                                      Please feel free to contact me with any questions or concerns you have.      Tatum Dickey, PharmD, BCACP  Medication Therapy Management Pharmacist   [facility]     "

## 2025-07-30 NOTE — Clinical Note
FYI - patient had questions about Botox today and described pain and tightness in her neck and as it spreads leads to a migraine. I recommended she schedule a follow-up with you.

## 2025-07-30 NOTE — PROGRESS NOTES
Medication Therapy Management (MTM) Encounter    ASSESSMENT:                            Medication Adherence/Access: No issues identified.    Headache   Migraine:    Migraines are not currently controlled with current medication regimen which was recently adjusted by primary care provider due to not tolerating candesartan and propranolol. Patient may benefit from additional therapy. Today reviewed possible options including a CGRP inhibitor (both injectables and oral options) and memantine, and recommend a follow-up with Dr. Hoffman regarding Botox questions. At this time would try to avoid TCA's, venlafaxine, gabapentin/pregabalin, verapamil, and topiramate due to side effect concerns and kidney stone seen on recent imaging. Patient will be establishing with Dr. Hallman and recommend discussing medications at this visit.     Of note, patient has noticed improvement in neck pain and migraines after taking dicyclomine which she is taking for GI issues. Since this medication is a smooth muscle relaxant and she is finding benefit, patient may find a skeletal muscle relaxant such as cyclobenzaprine even more beneficial for the neck pain that then develops into a migraine that was described today. Will follow-up with primary care provider to discuss medication and see if agreeable to prescribe until patient is able to establish with neurology clinic.     PLAN:                            Continue with current medications  Try to get an appointment with Dr. Hoffman   You have an appointment with Dr. Hallman on 8/20  I will discuss possibly adding a muscle relaxer, cyclobenzaprine, with your primary care provider to see if that helps with any of the neck pain and stiffness     Follow-up: 3-6 months or sooner depending on medication questions or concerns.    SUBJECTIVE/OBJECTIVE:                          Radhika Huitron is a 64 year old female seen for a follow-up visit.       Reason for visit: Migraine medication  follow-up.    Allergies/ADRs: Reviewed in chart  Past Medical History: Reviewed in chart  Tobacco: She reports that she has never smoked. She has never used smokeless tobacco.  Alcohol: Less than 1 beverage / month    Medication Adherence/Access: no issues reported.    Headache   Migraine:   Preventive medications    -riboflavin 400mg once daily   -co-enzyme q 10 300mg daily     -nadolol 20mg once daily - patient reports primary care provider changed to this medication because wasn't tolerating propranolol, doesn't feel much change in migraines now that she has switched to a different beta blocker but does feel like she has more energy   Candesartan also stopped due to possible side effects      Acute medications  -Sumatriptan 50 mg at onset of migraine. Continues to use the full prescription each month   -Acetaminophen 500mg every 6 hours as needed - needing once or twice per month  -Naproxen 220mg every 12 hours as needed - rarely takes because not as helpful as acetaminophen  -Nurtec 75mg once daily as needed at the onset of a migraine. This is very expensive and not always the most helpful.     She will be establishing with Dr. Hallman in August     Has questions about Botox    Declines medications that could affect memory like amitriptyline or topiramate or gabapentin   Also concerned about medications that can cause GI issues like constipation       Migraines still the same, she is getting about 2-3 migraines per week. She needs to find more relief     Patient reports there was a small kidney stone seen a recent CT scan recently       Patient will have some neck pain and tightness on her right side that will start to increase and spread and then start to have a migraine. She has been taking dicyclomine for some other issues but this has started to help with her neck pain and relieve her migraines. Wondering if a muscle relaxant could be helpful     She is on Medicare now and cost can be a concern      Medications tried and failed   -candesartan - GI issues  -propranolol - felt fatigued  -naratriptan - not effective  -Sumatriptan nasal spray - worsened migraines          Today's Vitals: There were no vitals taken for this visit.  ----------------      I spent 36 minutes with this patient today. I offer these suggestions for consideration by Dr. Hoffman.     A summary of these recommendations was sent via [employer].    Tatum Dickey, PharmD, BCACP  Medication Therapy Management Pharmacist   [facility]      Telemedicine Visit Details  The patient's medications can be safely assessed via a telemedicine encounter.  Type of service:  Telephone visit  Originating Location (pt. Location): Home  Distant Location (provider location):  Off-site  Start Time: 11:00 AM   End Time: 11:36 AM     Medication Therapy Recommendations  No medication therapy recommendations to display

## 2025-07-31 RX ORDER — DICYCLOMINE HYDROCHLORIDE 10 MG/1
10 CAPSULE ORAL 3 TIMES DAILY PRN
COMMUNITY

## 2025-08-08 ENCOUNTER — ANCILLARY PROCEDURE (OUTPATIENT)
Dept: CT IMAGING | Facility: CLINIC | Age: 65
End: 2025-08-08
Attending: FAMILY MEDICINE
Payer: MEDICARE

## 2025-08-08 ENCOUNTER — OFFICE VISIT (OUTPATIENT)
Dept: PEDIATRICS | Facility: CLINIC | Age: 65
End: 2025-08-08
Payer: MEDICARE

## 2025-08-08 VITALS
DIASTOLIC BLOOD PRESSURE: 91 MMHG | TEMPERATURE: 98.1 F | SYSTOLIC BLOOD PRESSURE: 169 MMHG | HEART RATE: 71 BPM | RESPIRATION RATE: 14 BRPM | OXYGEN SATURATION: 99 %

## 2025-08-08 DIAGNOSIS — R10.31 RLQ ABDOMINAL PAIN: Primary | ICD-10-CM

## 2025-08-08 DIAGNOSIS — R10.31 RLQ ABDOMINAL PAIN: ICD-10-CM

## 2025-08-08 LAB
ANION GAP SERPL CALCULATED.3IONS-SCNC: 15 MMOL/L (ref 7–15)
BUN SERPL-MCNC: 12.3 MG/DL (ref 8–23)
CALCIUM SERPL-MCNC: 10.1 MG/DL (ref 8.8–10.4)
CHLORIDE SERPL-SCNC: 98 MMOL/L (ref 98–107)
CREAT SERPL-MCNC: 0.78 MG/DL (ref 0.51–0.95)
EGFRCR SERPLBLD CKD-EPI 2021: 83 ML/MIN/1.73M2
ERYTHROCYTE [DISTWIDTH] IN BLOOD BY AUTOMATED COUNT: 11.9 % (ref 10–15)
GLUCOSE SERPL-MCNC: 109 MG/DL (ref 70–99)
HCO3 SERPL-SCNC: 24 MMOL/L (ref 22–29)
HCT VFR BLD AUTO: 42.9 % (ref 35–47)
HGB BLD-MCNC: 14.6 G/DL (ref 11.7–15.7)
MCH RBC QN AUTO: 30.9 PG (ref 26.5–33)
MCHC RBC AUTO-ENTMCNC: 34 G/DL (ref 31.5–36.5)
MCV RBC AUTO: 91 FL (ref 78–100)
PLATELET # BLD AUTO: 274 10E3/UL (ref 150–450)
POTASSIUM SERPL-SCNC: 3.8 MMOL/L (ref 3.4–5.3)
RBC # BLD AUTO: 4.73 10E6/UL (ref 3.8–5.2)
SODIUM SERPL-SCNC: 137 MMOL/L (ref 135–145)
WBC # BLD AUTO: 7.1 10E3/UL (ref 4–11)

## 2025-08-08 PROCEDURE — 3080F DIAST BP >= 90 MM HG: CPT | Performed by: FAMILY MEDICINE

## 2025-08-08 PROCEDURE — 3077F SYST BP >= 140 MM HG: CPT | Performed by: FAMILY MEDICINE

## 2025-08-08 PROCEDURE — 1125F AMNT PAIN NOTED PAIN PRSNT: CPT | Performed by: FAMILY MEDICINE

## 2025-08-08 PROCEDURE — 99215 OFFICE O/P EST HI 40 MIN: CPT | Performed by: FAMILY MEDICINE

## 2025-08-08 PROCEDURE — 74177 CT ABD & PELVIS W/CONTRAST: CPT | Performed by: STUDENT IN AN ORGANIZED HEALTH CARE EDUCATION/TRAINING PROGRAM

## 2025-08-08 PROCEDURE — 80048 BASIC METABOLIC PNL TOTAL CA: CPT | Performed by: FAMILY MEDICINE

## 2025-08-08 PROCEDURE — 36415 COLL VENOUS BLD VENIPUNCTURE: CPT | Performed by: FAMILY MEDICINE

## 2025-08-08 PROCEDURE — 85027 COMPLETE CBC AUTOMATED: CPT | Performed by: FAMILY MEDICINE

## 2025-08-08 RX ORDER — DICYCLOMINE HYDROCHLORIDE 10 MG/1
10 CAPSULE ORAL 4 TIMES DAILY PRN
Qty: 120 CAPSULE | Refills: 0 | Status: SHIPPED | OUTPATIENT
Start: 2025-08-08

## 2025-08-08 RX ORDER — IOPAMIDOL 755 MG/ML
74 INJECTION, SOLUTION INTRAVASCULAR ONCE
Status: COMPLETED | OUTPATIENT
Start: 2025-08-08 | End: 2025-08-08

## 2025-08-08 RX ADMIN — IOPAMIDOL 74 ML: 755 INJECTION, SOLUTION INTRAVASCULAR at 15:15

## 2025-08-08 ASSESSMENT — PAIN SCALES - GENERAL: PAINLEVEL_OUTOF10: MODERATE PAIN (6)

## 2025-08-20 ENCOUNTER — OFFICE VISIT (OUTPATIENT)
Dept: NEUROLOGY | Facility: CLINIC | Age: 65
End: 2025-08-20
Attending: INTERNAL MEDICINE
Payer: MEDICARE

## 2025-08-20 VITALS
BODY MASS INDEX: 20.41 KG/M2 | WEIGHT: 127 LBS | HEART RATE: 69 BPM | HEIGHT: 66 IN | SYSTOLIC BLOOD PRESSURE: 152 MMHG | DIASTOLIC BLOOD PRESSURE: 86 MMHG

## 2025-08-20 DIAGNOSIS — G43.009 MIGRAINE WITHOUT AURA AND WITHOUT STATUS MIGRAINOSUS, NOT INTRACTABLE: Primary | ICD-10-CM

## 2025-08-20 DIAGNOSIS — G43.719 INTRACTABLE CHRONIC MIGRAINE WITHOUT AURA AND WITHOUT STATUS MIGRAINOSUS: Primary | ICD-10-CM

## 2025-08-20 DIAGNOSIS — G43.719 INTRACTABLE CHRONIC MIGRAINE WITHOUT AURA AND WITHOUT STATUS MIGRAINOSUS: ICD-10-CM

## 2025-08-20 PROCEDURE — 3077F SYST BP >= 140 MM HG: CPT | Performed by: PSYCHIATRY & NEUROLOGY

## 2025-08-20 PROCEDURE — G2211 COMPLEX E/M VISIT ADD ON: HCPCS | Performed by: PSYCHIATRY & NEUROLOGY

## 2025-08-20 PROCEDURE — 99205 OFFICE O/P NEW HI 60 MIN: CPT | Performed by: PSYCHIATRY & NEUROLOGY

## 2025-08-20 PROCEDURE — 3079F DIAST BP 80-89 MM HG: CPT | Performed by: PSYCHIATRY & NEUROLOGY

## 2025-08-20 RX ORDER — RIZATRIPTAN BENZOATE 10 MG/1
10 TABLET ORAL
Qty: 9 TABLET | Refills: 11 | Status: SHIPPED | OUTPATIENT
Start: 2025-08-20

## 2025-08-25 ENCOUNTER — MYC MEDICAL ADVICE (OUTPATIENT)
Dept: FAMILY MEDICINE | Facility: CLINIC | Age: 65
End: 2025-08-25
Payer: MEDICARE

## 2025-08-25 DIAGNOSIS — I10 HYPERTENSION GOAL BP (BLOOD PRESSURE) < 140/90: Primary | ICD-10-CM

## 2025-08-26 RX ORDER — BISOPROLOL FUMARATE AND HYDROCHLOROTHIAZIDE 2.5; 6.25 MG/1; MG/1
1 TABLET ORAL DAILY
Qty: 90 TABLET | Refills: 3 | Status: SHIPPED | OUTPATIENT
Start: 2025-08-26

## 2025-08-29 SDOH — HEALTH STABILITY: PHYSICAL HEALTH: ON AVERAGE, HOW MANY DAYS PER WEEK DO YOU ENGAGE IN MODERATE TO STRENUOUS EXERCISE (LIKE A BRISK WALK)?: 5 DAYS

## 2025-08-29 SDOH — HEALTH STABILITY: PHYSICAL HEALTH: ON AVERAGE, HOW MANY MINUTES DO YOU ENGAGE IN EXERCISE AT THIS LEVEL?: 40 MIN

## 2025-09-02 ENCOUNTER — TRANSFERRED RECORDS (OUTPATIENT)
Dept: ADMINISTRATIVE | Facility: CLINIC | Age: 65
End: 2025-09-02
Payer: MEDICARE

## 2025-09-03 ENCOUNTER — OFFICE VISIT (OUTPATIENT)
Dept: FAMILY MEDICINE | Facility: CLINIC | Age: 65
End: 2025-09-03
Payer: MEDICARE

## 2025-09-03 ENCOUNTER — PATIENT OUTREACH (OUTPATIENT)
Dept: GASTROENTEROLOGY | Facility: CLINIC | Age: 65
End: 2025-09-03

## 2025-09-03 ENCOUNTER — RESULTS FOLLOW-UP (OUTPATIENT)
Dept: GASTROENTEROLOGY | Facility: CLINIC | Age: 65
End: 2025-09-03

## 2025-09-03 VITALS
BODY MASS INDEX: 20.45 KG/M2 | WEIGHT: 130.3 LBS | TEMPERATURE: 97.9 F | DIASTOLIC BLOOD PRESSURE: 72 MMHG | RESPIRATION RATE: 18 BRPM | HEIGHT: 67 IN | HEART RATE: 68 BPM | OXYGEN SATURATION: 98 % | SYSTOLIC BLOOD PRESSURE: 118 MMHG

## 2025-09-03 DIAGNOSIS — E28.39 ESTROGEN DEFICIENCY: ICD-10-CM

## 2025-09-03 DIAGNOSIS — Z00.00 WELCOME TO MEDICARE PREVENTIVE VISIT: Primary | ICD-10-CM

## 2025-09-03 DIAGNOSIS — R00.2 PALPITATIONS: ICD-10-CM

## 2025-09-03 DIAGNOSIS — Z23 NEED FOR VACCINATION: ICD-10-CM

## 2025-09-03 DIAGNOSIS — R10.31 RLQ ABDOMINAL PAIN: ICD-10-CM

## 2025-09-03 DIAGNOSIS — E78.5 HYPERLIPIDEMIA LDL GOAL <100: ICD-10-CM

## 2025-09-03 DIAGNOSIS — Z13.6 ENCOUNTER FOR SCREENING FOR CORONARY ARTERY DISEASE: ICD-10-CM

## 2025-09-03 DIAGNOSIS — N20.0 KIDNEY STONE: ICD-10-CM

## 2025-09-03 DIAGNOSIS — I10 HYPERTENSION GOAL BP (BLOOD PRESSURE) < 140/90: ICD-10-CM

## 2025-09-03 DIAGNOSIS — E67.3 HIGH VITAMIN D LEVEL: ICD-10-CM

## 2025-09-03 DIAGNOSIS — Z00.00 ENCOUNTER FOR MEDICARE ANNUAL WELLNESS EXAM: ICD-10-CM

## 2025-09-03 DIAGNOSIS — E53.8 VITAMIN B12 DEFICIENCY (NON ANEMIC): ICD-10-CM

## 2025-09-03 PROBLEM — D12.6 ADENOMATOUS POLYP OF COLON: Status: ACTIVE | Noted: 2025-09-03

## 2025-09-03 PROCEDURE — 3078F DIAST BP <80 MM HG: CPT | Performed by: INTERNAL MEDICINE

## 2025-09-03 PROCEDURE — G2211 COMPLEX E/M VISIT ADD ON: HCPCS | Performed by: INTERNAL MEDICINE

## 2025-09-03 PROCEDURE — 3074F SYST BP LT 130 MM HG: CPT | Performed by: INTERNAL MEDICINE

## 2025-09-03 PROCEDURE — G0402 INITIAL PREVENTIVE EXAM: HCPCS | Performed by: INTERNAL MEDICINE

## 2025-09-03 PROCEDURE — 99214 OFFICE O/P EST MOD 30 MIN: CPT | Mod: 25 | Performed by: INTERNAL MEDICINE

## 2025-09-03 RX ORDER — BISOPROLOL FUMARATE AND HYDROCHLOROTHIAZIDE 5; 6.25 MG/1; MG/1
1 TABLET ORAL DAILY
Qty: 30 TABLET | Refills: 0 | Status: SHIPPED | OUTPATIENT
Start: 2025-09-03

## 2025-09-03 RX ORDER — DICYCLOMINE HYDROCHLORIDE 10 MG/1
10 CAPSULE ORAL 2 TIMES DAILY PRN
Qty: 180 CAPSULE | Refills: 2 | Status: SHIPPED | OUTPATIENT
Start: 2025-09-03

## 2025-09-03 RX ORDER — ATENOLOL 25 MG/1
12.5-25 TABLET ORAL DAILY PRN
Qty: 30 TABLET | Refills: 0 | Status: SHIPPED | OUTPATIENT
Start: 2025-09-03 | End: 2025-09-03